# Patient Record
Sex: MALE | Race: WHITE | NOT HISPANIC OR LATINO | Employment: FULL TIME | URBAN - METROPOLITAN AREA
[De-identification: names, ages, dates, MRNs, and addresses within clinical notes are randomized per-mention and may not be internally consistent; named-entity substitution may affect disease eponyms.]

---

## 2018-03-29 RX ORDER — LOSARTAN POTASSIUM 50 MG/1
50 TABLET ORAL DAILY
COMMUNITY
Start: 2017-07-19 | End: 2018-03-29 | Stop reason: SDUPTHER

## 2018-03-29 RX ORDER — LOSARTAN POTASSIUM 50 MG/1
50 TABLET ORAL DAILY
Qty: 90 TABLET | Refills: 3 | Status: SHIPPED | OUTPATIENT
Start: 2018-03-29 | End: 2019-01-30 | Stop reason: SDUPTHER

## 2018-05-14 ENCOUNTER — OFFICE VISIT (OUTPATIENT)
Dept: PRIMARY CARE | Facility: CLINIC | Age: 58
End: 2018-05-14
Payer: COMMERCIAL

## 2018-05-14 VITALS
OXYGEN SATURATION: 98 % | RESPIRATION RATE: 14 BRPM | BODY MASS INDEX: 26.99 KG/M2 | TEMPERATURE: 98.2 F | DIASTOLIC BLOOD PRESSURE: 70 MMHG | HEART RATE: 68 BPM | SYSTOLIC BLOOD PRESSURE: 110 MMHG | HEIGHT: 71 IN | WEIGHT: 192.8 LBS

## 2018-05-14 DIAGNOSIS — I10 ESSENTIAL HYPERTENSION: ICD-10-CM

## 2018-05-14 DIAGNOSIS — Z00.00 PHYSICAL EXAM: Primary | ICD-10-CM

## 2018-05-14 PROCEDURE — 99396 PREV VISIT EST AGE 40-64: CPT | Performed by: INTERNAL MEDICINE

## 2018-05-14 RX ORDER — TOBRAMYCIN AND DEXAMETHASONE 3; 1 MG/ML; MG/ML
SUSPENSION/ DROPS OPHTHALMIC
COMMUNITY
Start: 2017-05-08 | End: 2018-05-14

## 2018-05-14 RX ORDER — GUAIFENESIN 600 MG/1
1200 TABLET, EXTENDED RELEASE ORAL 2 TIMES DAILY
COMMUNITY
End: 2023-05-12 | Stop reason: ALTCHOICE

## 2018-05-14 RX ORDER — VIT C/E/ZN/COPPR/LUTEIN/ZEAXAN 250MG-90MG
1000 CAPSULE ORAL DAILY
COMMUNITY
Start: 2015-10-15

## 2018-05-14 RX ORDER — IPRATROPIUM BROMIDE 21 UG/1
2 SPRAY, METERED NASAL
Refills: 3 | COMMUNITY
Start: 2018-04-23 | End: 2023-05-12 | Stop reason: ALTCHOICE

## 2018-05-14 RX ORDER — ASPIRIN 81 MG/1
81 TABLET ORAL
COMMUNITY
Start: 2012-03-29 | End: 2023-05-12 | Stop reason: ALTCHOICE

## 2018-05-14 RX ORDER — MULTIVITAMIN
TABLET ORAL
COMMUNITY
Start: 2012-03-29 | End: 2023-05-12 | Stop reason: ALTCHOICE

## 2018-05-14 RX ORDER — MONTELUKAST SODIUM 10 MG/1
10 TABLET ORAL
Refills: 3 | COMMUNITY
Start: 2018-04-23 | End: 2023-05-12 | Stop reason: SDUPTHER

## 2018-05-14 ASSESSMENT — ENCOUNTER SYMPTOMS
EYE ITCHING: 0
EYE DISCHARGE: 0
BACK PAIN: 0
COUGH: 0
POLYDIPSIA: 0
CONFUSION: 0
SHORTNESS OF BREATH: 0
ABDOMINAL DISTENTION: 0
ABDOMINAL PAIN: 0
DYSURIA: 0
LIGHT-HEADEDNESS: 0
SORE THROAT: 0
SINUS PRESSURE: 0
RHINORRHEA: 0
FATIGUE: 0
FREQUENCY: 0
FEVER: 0
DIZZINESS: 0
CHILLS: 0
APNEA: 0
NERVOUS/ANXIOUS: 0
ARTHRALGIAS: 0
SINUS PAIN: 0

## 2018-05-14 NOTE — PROGRESS NOTES
"Subjective  here for physical     Patient ID: Joseph Fletcher is a 57 y.o. male.    HPI no complaints    The following have been reviewed and updated as appropriate in this visit:       Review of Systems   Constitutional: Negative for chills, fatigue and fever.   HENT: Negative for congestion, rhinorrhea, sinus pain, sinus pressure and sore throat.    Eyes: Negative for discharge and itching.   Respiratory: Negative for apnea, cough and shortness of breath.    Cardiovascular: Negative for chest pain and leg swelling.   Gastrointestinal: Negative for abdominal distention and abdominal pain.   Endocrine: Negative for polydipsia and polyuria.   Genitourinary: Negative for dysuria and frequency.   Musculoskeletal: Negative for arthralgias and back pain.   Neurological: Negative for dizziness and light-headedness.   Psychiatric/Behavioral: Negative for confusion. The patient is not nervous/anxious.        Objective     Vitals:    05/14/18 1420   BP: 110/70   BP Location: Left upper arm   Patient Position: Sitting   Pulse: 68   Resp: 14   Temp: 36.8 °C (98.2 °F)   TempSrc: Oral   SpO2: 98%   Weight: 87.5 kg (192 lb 12.8 oz)   Height: 1.803 m (5' 11\")     Body mass index is 26.89 kg/m².    Physical Exam   Constitutional: He is oriented to person, place, and time. He appears well-developed and well-nourished.   HENT:   Head: Normocephalic and atraumatic.   Nose: Nose normal.   Mouth/Throat: Oropharynx is clear and moist.   Eyes: Conjunctivae and EOM are normal. Pupils are equal, round, and reactive to light.   Cardiovascular: Normal rate, regular rhythm, normal heart sounds and intact distal pulses.  Exam reveals no gallop and no friction rub.    No murmur heard.  Pulmonary/Chest: Effort normal and breath sounds normal. No respiratory distress. He has no wheezes. He has no rales. He exhibits no tenderness.   Musculoskeletal: Normal range of motion. He exhibits no edema.   Neurological: He is alert and oriented to person, " place, and time. He displays normal reflexes. No cranial nerve deficit or sensory deficit. He exhibits normal muscle tone. Coordination normal.   Skin: Skin is warm and dry.       Assessment/Plan   Problem List Items Addressed This Visit     HTN (hypertension)     Pressure at goal         Physical exam - Primary     Normal.  Check labs         Relevant Orders    Comprehensive metabolic panel    Lipid panel    CBC and Differential    Hepatitis C antibody    PSA

## 2018-05-15 LAB
ALBUMIN SERPL-MCNC: 4.1 G/DL (ref 3.6–5.1)
ALBUMIN/GLOB SERPL: 1.4 (CALC) (ref 1–2.5)
ALP SERPL-CCNC: 50 U/L (ref 40–115)
ALT SERPL-CCNC: 23 U/L (ref 9–46)
AST SERPL-CCNC: 23 U/L (ref 10–35)
BASOPHILS # BLD AUTO: 23 CELLS/UL (ref 0–200)
BASOPHILS NFR BLD AUTO: 0.3 %
BILIRUB SERPL-MCNC: 0.8 MG/DL (ref 0.2–1.2)
BUN SERPL-MCNC: 26 MG/DL (ref 7–25)
BUN/CREAT SERPL: 21 (CALC) (ref 6–22)
CALCIUM SERPL-MCNC: 9.6 MG/DL (ref 8.6–10.3)
CHLORIDE SERPL-SCNC: 106 MMOL/L (ref 98–110)
CHOLEST SERPL-MCNC: 168 MG/DL
CHOLEST/HDLC SERPL: 2.7 (CALC)
CO2 SERPL-SCNC: 27 MMOL/L (ref 20–31)
CREAT SERPL-MCNC: 1.23 MG/DL (ref 0.7–1.33)
EOSINOPHIL # BLD AUTO: 31 CELLS/UL (ref 15–500)
EOSINOPHIL NFR BLD AUTO: 0.4 %
ERYTHROCYTE [DISTWIDTH] IN BLOOD BY AUTOMATED COUNT: 12.1 % (ref 11–15)
GFR SERPL CREATININE-BSD FRML MDRD: 64 ML/MIN/1.73M2
GLOBULIN SER CALC-MCNC: 3 G/DL (CALC) (ref 1.9–3.7)
GLUCOSE SERPL-MCNC: 88 MG/DL (ref 65–99)
HCT VFR BLD AUTO: 44.8 % (ref 38.5–50)
HCV AB S/CO SERPL IA: 0.02
HCV AB SERPL QL IA: NORMAL
HDLC SERPL-MCNC: 63 MG/DL
HGB BLD-MCNC: 15.1 G/DL (ref 13.2–17.1)
LDLC SERPL CALC-MCNC: 94 MG/DL (CALC)
LYMPHOCYTES # BLD AUTO: 1654 CELLS/UL (ref 850–3900)
LYMPHOCYTES NFR BLD AUTO: 21.2 %
MCH RBC QN AUTO: 30.9 PG (ref 27–33)
MCHC RBC AUTO-ENTMCNC: 33.7 G/DL (ref 32–36)
MCV RBC AUTO: 91.6 FL (ref 80–100)
MONOCYTES # BLD AUTO: 538 CELLS/UL (ref 200–950)
MONOCYTES NFR BLD AUTO: 6.9 %
NEUTROPHILS # BLD AUTO: 5554 CELLS/UL (ref 1500–7800)
NEUTROPHILS NFR BLD AUTO: 71.2 %
NONHDLC SERPL-MCNC: 105 MG/DL (CALC)
PLATELET # BLD AUTO: 244 THOUSAND/UL (ref 140–400)
PMV BLD REES-ECKER: 10.2 FL (ref 7.5–12.5)
POTASSIUM SERPL-SCNC: 4.3 MMOL/L (ref 3.5–5.3)
PROT SERPL-MCNC: 7.1 G/DL (ref 6.1–8.1)
PSA SERPL-MCNC: 1.4 NG/ML
RBC # BLD AUTO: 4.89 MILLION/UL (ref 4.2–5.8)
SODIUM SERPL-SCNC: 140 MMOL/L (ref 135–146)
TRIGL SERPL-MCNC: 39 MG/DL
WBC # BLD AUTO: 7.8 THOUSAND/UL (ref 3.8–10.8)

## 2019-01-30 ENCOUNTER — TELEPHONE (OUTPATIENT)
Dept: PRIMARY CARE | Facility: CLINIC | Age: 59
End: 2019-01-30

## 2019-01-30 RX ORDER — LOSARTAN POTASSIUM 50 MG/1
50 TABLET ORAL DAILY
Qty: 90 TABLET | Refills: 3 | Status: SHIPPED | OUTPATIENT
Start: 2019-01-30 | End: 2020-03-16 | Stop reason: SDUPTHER

## 2019-01-30 NOTE — TELEPHONE ENCOUNTER
Pt called stating his meds need to be faxed to [phone].  Any questions please contact Mr. Fletcher at [phone].

## 2019-03-08 ENCOUNTER — TELEPHONE (OUTPATIENT)
Dept: PRIMARY CARE | Facility: CLINIC | Age: 59
End: 2019-03-08

## 2019-03-08 NOTE — TELEPHONE ENCOUNTER
Pts sister was just diagnose with Bicuspid dilated aortic valve and recently had surgery for this. He was told its genetic and he should have an ultrasound of his heart    Can you please call to discuss and let him know if we can order or if dr jimenez needs to see him

## 2019-03-08 NOTE — TELEPHONE ENCOUNTER
I ledesma erick pt. He was pawan yarbrough his sister's cardiologist that it is important that he has the US of his heart to help r/o the same Bicuspid valve disease that his siter has. I told him I would talk to dr. Cochran.

## 2019-05-21 ENCOUNTER — TRANSCRIBE ORDERS (OUTPATIENT)
Dept: SCHEDULING | Age: 59
End: 2019-05-21

## 2019-05-21 DIAGNOSIS — M25.562 PAIN IN LEFT KNEE: Primary | ICD-10-CM

## 2019-06-03 ENCOUNTER — OFFICE VISIT (OUTPATIENT)
Dept: PRIMARY CARE | Facility: CLINIC | Age: 59
End: 2019-06-03
Payer: COMMERCIAL

## 2019-06-03 VITALS
SYSTOLIC BLOOD PRESSURE: 122 MMHG | HEART RATE: 57 BPM | HEIGHT: 70 IN | DIASTOLIC BLOOD PRESSURE: 70 MMHG | RESPIRATION RATE: 20 BRPM | BODY MASS INDEX: 27.77 KG/M2 | TEMPERATURE: 97.9 F | OXYGEN SATURATION: 98 % | WEIGHT: 194 LBS

## 2019-06-03 DIAGNOSIS — I10 ESSENTIAL HYPERTENSION: ICD-10-CM

## 2019-06-03 DIAGNOSIS — M15.0 PRIMARY OSTEOARTHRITIS INVOLVING MULTIPLE JOINTS: ICD-10-CM

## 2019-06-03 DIAGNOSIS — Z00.00 PHYSICAL EXAM: ICD-10-CM

## 2019-06-03 DIAGNOSIS — Z12.11 SCREENING FOR COLON CANCER: Primary | ICD-10-CM

## 2019-06-03 PROCEDURE — 99214 OFFICE O/P EST MOD 30 MIN: CPT | Performed by: INTERNAL MEDICINE

## 2019-06-03 NOTE — PROGRESS NOTES
"Subjective      Patient ID: Joseph Fletcher is a 58 y.o. male.  1960      HPI  Patient here for his annual physical examination  He is normally seen and followed up by Dr. Nic Cochran.  Patient medical records reviewed.  He offers no new complaints he is extremely active but lately has noticed that after playing sports like tennis he gets bilateral knee pain weakness and fatigue.  He denied any chest pain no radiation of pain no palpitation no diaphoresis no history of cough fever chills or dysuria  No abdominal pain no nausea vomiting diarrhea patient's bowel movements are regular  No headaches visual symptom dizziness fainting spell or syncopal episode.  Patient is compliant with medication he does not report any intolerance of any side effects to the current medications  Sleep is adequate occasionally wakes up at night to go to the bathroom.  Appetite is good with good p.o. intake patient is not taking any particular type output diet.  He is very active and most of the exercises or sports.  The following have been reviewed and updated as appropriate in this visit:  Problems       Review of Systems   All other systems reviewed and are negative.  Except as noted in HPI    Objective     Vitals:    06/03/19 1404   BP: 122/70   BP Location: Left upper arm   Patient Position: Sitting   Pulse: (!) 57   Resp: 20   Temp: 36.6 °C (97.9 °F)   TempSrc: Oral   SpO2: 98%   Weight: 88 kg (194 lb)   Height: 1.778 m (5' 10\")     Body mass index is 27.84 kg/m².    Physical Exam   Constitutional: He is oriented to person, place, and time. He appears well-developed and well-nourished.   HENT:   Head: Normocephalic and atraumatic.   Eyes: Pupils are equal, round, and reactive to light. EOM are normal.   Neck: Normal range of motion. Neck supple.   Cardiovascular: Normal rate, regular rhythm, normal heart sounds and intact distal pulses.    Pulmonary/Chest: Effort normal and breath sounds normal.   Abdominal: Soft. Bowel sounds " are normal. He exhibits no distension and no mass. There is no tenderness. There is no guarding.   Musculoskeletal: Normal range of motion.   Neurological: He is alert and oriented to person, place, and time. No cranial nerve deficit.   Skin: Skin is warm and dry.   Vitals reviewed.      Assessment/Plan   Diagnoses and all orders for this visit:    Screening for colon cancer (Primary)  -     Direct Access Colonoscopy [facility]; Future    Essential hypertension    Primary osteoarthritis involving multiple joints    Physical exam  -     Basic metabolic panel; Future  -     CBC and Differential; Future  -     Hemoglobin A1c; Future  -     Hepatic function panel; Future  -     Lipid panel; Future  -     TSH 3rd Generation; Future  -     PSA; Future    Patient's physical examination is within normal limits.  I referred him for colonoscopy  I referred him for routine laboratory work-up.  Patient has schedule appointment with Dr. Park from urology next month.

## 2019-06-05 LAB
ALBUMIN SERPL-MCNC: 3.9 G/DL (ref 3.6–5.1)
ALBUMIN/GLOB SERPL: 1.6 (CALC) (ref 1–2.5)
ALP SERPL-CCNC: 41 U/L (ref 40–115)
ALT SERPL-CCNC: 20 U/L (ref 9–46)
AST SERPL-CCNC: 20 U/L (ref 10–35)
BASOPHILS # BLD AUTO: 22 CELLS/UL (ref 0–200)
BASOPHILS NFR BLD AUTO: 0.4 %
BILIRUB DIRECT SERPL-MCNC: 0.2 MG/DL
BILIRUB INDIRECT SERPL-MCNC: 0.7 MG/DL (CALC) (ref 0.2–1.2)
BILIRUB SERPL-MCNC: 0.9 MG/DL (ref 0.2–1.2)
BUN SERPL-MCNC: 21 MG/DL (ref 7–25)
BUN/CREAT SERPL: NORMAL (CALC) (ref 6–22)
CALCIUM SERPL-MCNC: 9.1 MG/DL (ref 8.6–10.3)
CHLORIDE SERPL-SCNC: 106 MMOL/L (ref 98–110)
CHOLEST SERPL-MCNC: 183 MG/DL
CHOLEST/HDLC SERPL: 3.2 (CALC)
CO2 SERPL-SCNC: 29 MMOL/L (ref 20–32)
CREAT SERPL-MCNC: 1.04 MG/DL (ref 0.7–1.33)
EOSINOPHIL # BLD AUTO: 83 CELLS/UL (ref 15–500)
EOSINOPHIL NFR BLD AUTO: 1.5 %
ERYTHROCYTE [DISTWIDTH] IN BLOOD BY AUTOMATED COUNT: 12.3 % (ref 11–15)
GLOBULIN SER CALC-MCNC: 2.5 G/DL (CALC) (ref 1.9–3.7)
GLUCOSE SERPL-MCNC: 94 MG/DL (ref 65–99)
HBA1C MFR BLD: 5.3 % OF TOTAL HGB
HCT VFR BLD AUTO: 45 % (ref 38.5–50)
HDLC SERPL-MCNC: 57 MG/DL
HGB BLD-MCNC: 15.1 G/DL (ref 13.2–17.1)
LDLC SERPL CALC-MCNC: 112 MG/DL (CALC)
LYMPHOCYTES # BLD AUTO: 1474 CELLS/UL (ref 850–3900)
LYMPHOCYTES NFR BLD AUTO: 26.8 %
MCH RBC QN AUTO: 31.1 PG (ref 27–33)
MCHC RBC AUTO-ENTMCNC: 33.6 G/DL (ref 32–36)
MCV RBC AUTO: 92.6 FL (ref 80–100)
MONOCYTES # BLD AUTO: 413 CELLS/UL (ref 200–950)
MONOCYTES NFR BLD AUTO: 7.5 %
NEUTROPHILS # BLD AUTO: 3509 CELLS/UL (ref 1500–7800)
NEUTROPHILS NFR BLD AUTO: 63.8 %
NONHDLC SERPL-MCNC: 126 MG/DL (CALC)
PLATELET # BLD AUTO: 214 THOUSAND/UL (ref 140–400)
PMV BLD REES-ECKER: 10 FL (ref 7.5–12.5)
POTASSIUM SERPL-SCNC: 4.4 MMOL/L (ref 3.5–5.3)
PROT SERPL-MCNC: 6.4 G/DL (ref 6.1–8.1)
PSA SERPL-MCNC: 1.5 NG/ML
QUEST EGFR NON-AFR. AMERICAN: 78 ML/MIN/1.73M2
RBC # BLD AUTO: 4.86 MILLION/UL (ref 4.2–5.8)
SODIUM SERPL-SCNC: 140 MMOL/L (ref 135–146)
TRIGL SERPL-MCNC: 55 MG/DL
TSH SERPL-ACNC: 1.72 MIU/L (ref 0.4–4.5)
WBC # BLD AUTO: 5.5 THOUSAND/UL (ref 3.8–10.8)

## 2019-06-07 ENCOUNTER — TELEPHONE (OUTPATIENT)
Dept: PRIMARY CARE | Facility: CLINIC | Age: 59
End: 2019-06-07

## 2019-06-11 NOTE — TELEPHONE ENCOUNTER
----- Message from Rosales Melgar MD sent at 6/11/2019 10:05 AM EDT -----  Please call and let patient know results of blood work.

## 2019-06-12 ENCOUNTER — TELEPHONE (OUTPATIENT)
Dept: PRIMARY CARE | Facility: CLINIC | Age: 59
End: 2019-06-12

## 2019-09-20 ENCOUNTER — TELEPHONE (OUTPATIENT)
Dept: PRIMARY CARE | Facility: CLINIC | Age: 59
End: 2019-09-20

## 2020-01-10 ENCOUNTER — TELEPHONE (OUTPATIENT)
Dept: PRIMARY CARE | Facility: CLINIC | Age: 60
End: 2020-01-10

## 2020-01-10 NOTE — TELEPHONE ENCOUNTER
Pt was diagnosed with seborrheic keratosis.  There is a process that the area has to be frozen and fallen off.  Does Dr. Cochran do this in the office or does he need to see a dermatologist.  Please advise.  Contact number  [phone]

## 2020-03-10 ENCOUNTER — TELEPHONE (OUTPATIENT)
Dept: PRIMARY CARE | Facility: CLINIC | Age: 60
End: 2020-03-10

## 2020-03-10 NOTE — TELEPHONE ENCOUNTER
Pt requesting a new prescription for Losartan 50 mg. Current prescription .    Pt states he needs the prescription filled today with [facility] so they can have it mailed out before his prescription runs out.  If it cannot be done today then the [facility] needs to be called so they can give him enough to cover until the mail order arrives.    Pt would like a call if prescription can be filled today or not. Pt can be reached at  [phone].

## 2020-03-16 RX ORDER — LOSARTAN POTASSIUM 50 MG/1
50 TABLET ORAL DAILY
Qty: 90 TABLET | Refills: 3 | Status: SHIPPED | OUTPATIENT
Start: 2020-03-16 | End: 2021-01-12 | Stop reason: ALTCHOICE

## 2020-03-16 RX ORDER — OLMESARTAN MEDOXOMIL 20 MG/1
20 TABLET ORAL DAILY
Qty: 30 TABLET | Refills: 0 | Status: SHIPPED | OUTPATIENT
Start: 2020-03-16 | End: 2020-04-08

## 2020-04-08 RX ORDER — OLMESARTAN MEDOXOMIL 20 MG/1
20 TABLET ORAL
Qty: 30 TABLET | Refills: 0 | Status: SHIPPED | OUTPATIENT
Start: 2020-04-08 | End: 2020-08-25

## 2020-04-08 NOTE — TELEPHONE ENCOUNTER
Medicine Refill Request    Last Office Visit: 6/3/2019  Next Office Visit: 6/8/2020      Current Outpatient Medications: •  aspirin 81 mg enteric coated tablet, 81 mg., Disp: , Rfl: •  cetirizine (ZyrTEC) 10 mg tablet, 10 mg., Disp: , Rfl: •  cholecalciferol, vitamin D3, (VITAMIN D3) 1,000 unit capsule, No SIG Entered, Disp: , Rfl: •  guaiFENesin (MUCINEX) 600 mg 12 hr tablet, Take 1,200 mg by mouth 2 (two) times a day., Disp: , Rfl: •  ipratropium (ATROVENT) 0.03 % nasal spray, Administer 2 sprays into each nostril 2 (two) times a day., Disp: , Rfl: 3•  losartan (COZAAR) 50 mg tablet, Take 1 tablet (50 mg total) by mouth daily., Disp: 90 tablet, Rfl: 3•  montelukast (SINGULAIR) 10 mg tablet, Take 10 mg by mouth once daily., Disp: , Rfl: 3•  multivitamin (THERAGRAN) tablet, take 1 tablet by oral route  every day with food, Disp: , Rfl: •  olmesartan (BENICAR) 20 mg tablet, Take 1 tablet (20 mg total) by mouth daily., Disp: 30 tablet, Rfl: 0      BP Readings from Last 3 Encounters:  06/03/19 : 122/70  05/14/18 : 110/70      Recent Lab results:  Lab Results       Component                Value               Date                       CHOL                     183                 06/03/2019          , Lab Results       Component                Value               Date                       HDL                      57                  06/03/2019          , Lab Results       Component                Value               Date                       LDLCALC                  112 (H)             06/03/2019          , Lab Results       Component                Value               Date                       TRIG                     55                  06/03/2019               Lab Results       Component                Value               Date                       GLUCOSE                  94                  06/03/2019          , Lab Results       Component                Value               Date                       HGBA1C                    5.3                 06/03/2019                Lab Results       Component                Value               Date                       CREATININE               1.04                06/03/2019              Lab Results       Component                Value               Date                       TSH                      1.72                06/03/2019

## 2020-05-22 ENCOUNTER — TELEPHONE (OUTPATIENT)
Dept: PRIMARY CARE | Facility: CLINIC | Age: 60
End: 2020-05-22

## 2020-05-22 DIAGNOSIS — Z13.0 SCREENING FOR IRON DEFICIENCY ANEMIA: ICD-10-CM

## 2020-05-22 DIAGNOSIS — N40.0 BENIGN PROSTATIC HYPERPLASIA, UNSPECIFIED WHETHER LOWER URINARY TRACT SYMPTOMS PRESENT: ICD-10-CM

## 2020-05-22 DIAGNOSIS — Z13.220 SCREENING FOR HYPERLIPIDEMIA: ICD-10-CM

## 2020-05-22 DIAGNOSIS — I10 ESSENTIAL HYPERTENSION: Primary | ICD-10-CM

## 2020-05-22 NOTE — TELEPHONE ENCOUNTER
This patient has an upcoming appointment with your office and would like to have their lab work completed prior to the visit.         Patient Preferred Laboratory: [facility]    Patient Primary Insurance in Epic:  N/A         or [place] Mail?:Email if possible, otherwise mail.  Pt stated that the wrong codes have been used in the past and he received a bill for his blood work.  He is requesting that the correct codes are used.

## 2020-06-05 ENCOUNTER — TRANSCRIBE ORDERS (OUTPATIENT)
Dept: LAB | Facility: HOSPITAL | Age: 60
End: 2020-06-05

## 2020-06-05 ENCOUNTER — APPOINTMENT (OUTPATIENT)
Dept: LAB | Facility: HOSPITAL | Age: 60
End: 2020-06-05
Attending: INTERNAL MEDICINE
Payer: COMMERCIAL

## 2020-06-05 DIAGNOSIS — N40.0 BENIGN PROSTATIC HYPERPLASIA WITHOUT LOWER URINARY TRACT SYMPTOMS: Primary | ICD-10-CM

## 2020-06-05 DIAGNOSIS — N40.0 BENIGN PROSTATIC HYPERPLASIA WITHOUT LOWER URINARY TRACT SYMPTOMS: ICD-10-CM

## 2020-06-05 PROCEDURE — 30099997 SPECIMEN PROCESSING

## 2020-06-07 LAB
ALBUMIN SERPL-MCNC: 4.3 G/DL (ref 3.6–5.1)
ALBUMIN/GLOB SERPL: 1.7 (CALC) (ref 1–2.5)
ALP SERPL-CCNC: 51 U/L (ref 35–144)
ALT SERPL-CCNC: 28 U/L (ref 9–46)
APPEARANCE UR: CLEAR
AST SERPL-CCNC: 26 U/L (ref 10–35)
BACTERIA #/AREA URNS HPF: ABNORMAL /HPF
BACTERIA UR CULT: ABNORMAL
BASOPHILS # BLD AUTO: 21 CELLS/UL (ref 0–200)
BASOPHILS NFR BLD AUTO: 0.3 %
BILIRUB SERPL-MCNC: 0.8 MG/DL (ref 0.2–1.2)
BILIRUB UR QL STRIP: NEGATIVE
BUN SERPL-MCNC: 28 MG/DL (ref 7–25)
BUN/CREAT SERPL: 23 (CALC) (ref 6–22)
CALCIUM SERPL-MCNC: 9.7 MG/DL (ref 8.6–10.3)
CHLORIDE SERPL-SCNC: 101 MMOL/L (ref 98–110)
CHOLEST SERPL-MCNC: 164 MG/DL
CHOLEST/HDLC SERPL: 3.2 (CALC)
CO2 SERPL-SCNC: 29 MMOL/L (ref 20–32)
COLOR UR: YELLOW
CREAT SERPL-MCNC: 1.23 MG/DL (ref 0.7–1.25)
EOSINOPHIL # BLD AUTO: 78 CELLS/UL (ref 15–500)
EOSINOPHIL NFR BLD AUTO: 1.1 %
ERYTHROCYTE [DISTWIDTH] IN BLOOD BY AUTOMATED COUNT: 12.5 % (ref 11–15)
GLOBULIN SER CALC-MCNC: 2.5 G/DL (CALC) (ref 1.9–3.7)
GLUCOSE SERPL-MCNC: 82 MG/DL (ref 65–99)
GLUCOSE UR QL STRIP: NEGATIVE
HCT VFR BLD AUTO: 45.3 % (ref 38.5–50)
HDLC SERPL-MCNC: 52 MG/DL
HGB BLD-MCNC: 15.1 G/DL (ref 13.2–17.1)
HGB UR QL STRIP: ABNORMAL
HYALINE CASTS #/AREA URNS LPF: ABNORMAL /LPF
KETONES UR QL STRIP: NEGATIVE
LDLC SERPL CALC-MCNC: 98 MG/DL (CALC)
LEUKOCYTE ESTERASE UR QL STRIP: NEGATIVE
LYMPHOCYTES # BLD AUTO: 2095 CELLS/UL (ref 850–3900)
LYMPHOCYTES NFR BLD AUTO: 29.5 %
MCH RBC QN AUTO: 30.4 PG (ref 27–33)
MCHC RBC AUTO-ENTMCNC: 33.3 G/DL (ref 32–36)
MCV RBC AUTO: 91.3 FL (ref 80–100)
MONOCYTES # BLD AUTO: 575 CELLS/UL (ref 200–950)
MONOCYTES NFR BLD AUTO: 8.1 %
NEUTROPHILS # BLD AUTO: 4331 CELLS/UL (ref 1500–7800)
NEUTROPHILS NFR BLD AUTO: 61 %
NITRITE UR QL STRIP: NEGATIVE
NONHDLC SERPL-MCNC: 112 MG/DL (CALC)
PH UR STRIP: 6 [PH] (ref 5–8)
PLATELET # BLD AUTO: 271 THOUSAND/UL (ref 140–400)
PMV BLD REES-ECKER: 10.3 FL (ref 7.5–12.5)
POTASSIUM SERPL-SCNC: 4.4 MMOL/L (ref 3.5–5.3)
PROT SERPL-MCNC: 6.8 G/DL (ref 6.1–8.1)
PROT UR QL STRIP: NEGATIVE
PSA SERPL-MCNC: 1.9 NG/ML
QUEST EGFR NON-AFR. AMERICAN: 63 ML/MIN/1.73M2
RBC # BLD AUTO: 4.96 MILLION/UL (ref 4.2–5.8)
RBC #/AREA URNS HPF: ABNORMAL /HPF
SODIUM SERPL-SCNC: 137 MMOL/L (ref 135–146)
SP GR UR STRIP: 1.02 (ref 1–1.03)
SQUAMOUS #/AREA URNS HPF: ABNORMAL /HPF
TRIGL SERPL-MCNC: 53 MG/DL
WBC # BLD AUTO: 7.1 THOUSAND/UL (ref 3.8–10.8)
WBC #/AREA URNS HPF: ABNORMAL /HPF

## 2020-06-15 ENCOUNTER — TELEPHONE (OUTPATIENT)
Dept: PRIMARY CARE | Facility: CLINIC | Age: 60
End: 2020-06-15

## 2020-06-15 NOTE — TELEPHONE ENCOUNTER
----- Message from Nic Cochran MD sent at 6/15/2020  1:33 PM EDT -----  Mild bump in PSA  Send to urology  ----- Message -----  From: Taz Goins Lab Results In  Sent: 6/5/2020  11:15 PM EDT  To: Nic Cochran MD

## 2020-06-16 ENCOUNTER — TELEPHONE (OUTPATIENT)
Dept: PRIMARY CARE | Facility: CLINIC | Age: 60
End: 2020-06-16

## 2020-08-25 RX ORDER — OLMESARTAN MEDOXOMIL 20 MG/1
TABLET ORAL
Qty: 90 TABLET | Refills: 3 | Status: SHIPPED | OUTPATIENT
Start: 2020-08-25 | End: 2021-01-12

## 2020-12-14 ENCOUNTER — OFFICE VISIT (OUTPATIENT)
Dept: PRIMARY CARE | Facility: CLINIC | Age: 60
End: 2020-12-14
Payer: COMMERCIAL

## 2020-12-14 VITALS
TEMPERATURE: 99 F | OXYGEN SATURATION: 98 % | RESPIRATION RATE: 18 BRPM | HEIGHT: 70 IN | WEIGHT: 193 LBS | DIASTOLIC BLOOD PRESSURE: 80 MMHG | BODY MASS INDEX: 27.63 KG/M2 | HEART RATE: 53 BPM | SYSTOLIC BLOOD PRESSURE: 120 MMHG

## 2020-12-14 DIAGNOSIS — L29.9 ITCHING: ICD-10-CM

## 2020-12-14 DIAGNOSIS — I10 ESSENTIAL HYPERTENSION: Primary | ICD-10-CM

## 2020-12-14 PROCEDURE — 99214 OFFICE O/P EST MOD 30 MIN: CPT | Performed by: INTERNAL MEDICINE

## 2020-12-14 ASSESSMENT — ENCOUNTER SYMPTOMS
SORE THROAT: 0
LIGHT-HEADEDNESS: 0
HEADACHES: 0
ABDOMINAL PAIN: 0
SHORTNESS OF BREATH: 0
APPETITE CHANGE: 0
DIARRHEA: 0
COLOR CHANGE: 0

## 2020-12-14 NOTE — PROGRESS NOTES
"      Subjective  itching     Patient ID: Joseph Fletcher is a 60 y.o. male.  1960      HPI 59 y/o male presents for chronic itching. Pt states the itching has been ongoing for years. Pt states his whole body is itching but the past month his arms have been worse. Pt states there is pain associated with the itching on the arms especially at night. Pt describes the feeling as if wanting to \"unzip his skin\". Pt states this has been affecting his sleep. Pt has followed up with an allergist and had gotten an allergy test without conclusive results. Pt is taking an allergy pill at night. Pt denies runny nose, mucus, and rash.    The following have been reviewed and updated as appropriate in this visit:  Problems       Review of Systems   Constitutional: Negative for appetite change.   HENT: Negative for ear pain and sore throat.    Respiratory: Negative for shortness of breath.    Cardiovascular: Negative for chest pain and leg swelling.   Gastrointestinal: Negative for abdominal pain and diarrhea.   Endocrine: Negative for heat intolerance.   Genitourinary: Negative for urgency.   Skin: Negative for color change.   Neurological: Negative for light-headedness and headaches.       Objective     Vitals:    12/14/20 1355 12/14/20 1409   BP: 122/66 120/80   BP Location:  Right upper arm   Patient Position:  Sitting   Pulse: (!) 53    Resp: 18    Temp: 37.2 °C (99 °F)    SpO2: 98%    Weight: 87.5 kg (193 lb)    Height: 1.778 m (5' 10\")      Body mass index is 27.69 kg/m².    Physical Exam  Vitals signs and nursing note reviewed.   Constitutional:       Appearance: He is well-developed.   HENT:      Head: Normocephalic and atraumatic.      Right Ear: External ear normal.      Left Ear: External ear normal.      Nose: Nose normal.   Eyes:      Conjunctiva/sclera: Conjunctivae normal.      Pupils: Pupils are equal, round, and reactive to light.   Neck:      Musculoskeletal: Normal range of motion and neck supple. "   Cardiovascular:      Rate and Rhythm: Normal rate and regular rhythm.      Heart sounds: Normal heart sounds.   Pulmonary:      Effort: Pulmonary effort is normal.      Breath sounds: Normal breath sounds.   Abdominal:      General: Bowel sounds are normal.      Palpations: Abdomen is soft.   Musculoskeletal: Normal range of motion.   Skin:     General: Skin is warm and dry.   Neurological:      Mental Status: He is alert and oriented to person, place, and time.         Assessment/Plan   Diagnoses and all orders for this visit:    Essential hypertension (Primary)  Comments:  Blood pressure okay    Itching  Comments:  This apparently has been a chronic issue.  Besides diffuse skin itching worse on his upper extremities no other allergic symptoms been going on for a long time         By signing my name below, IPop, attest that this documentation has been prepared under the direction and in the presence of Nic Cochran MD      12/14/2020 2:27 PM     By signing my name below, Pop HUANG, attest that this documentation has been prepared under the direction and in the presence of Nic Cochran MD      12/14/2020 2:27 PM     INic MD, personally performed the services described in this documentation, as documented by the scribe in my presence, and it is both accurate and complete.  12/14/2020 2:27 PM

## 2020-12-15 ENCOUNTER — LAB REQUISITION (OUTPATIENT)
Dept: LAB | Facility: HOSPITAL | Age: 60
End: 2020-12-15
Attending: UROLOGY
Payer: COMMERCIAL

## 2020-12-15 ENCOUNTER — APPOINTMENT (RX ONLY)
Dept: URBAN - METROPOLITAN AREA CLINIC 28 | Facility: CLINIC | Age: 60
Setting detail: DERMATOLOGY
End: 2020-12-15

## 2020-12-15 DIAGNOSIS — L27.0 GENERALIZED SKIN ERUPTION DUE TO DRUGS AND MEDICAMENTS TAKEN INTERNALLY: ICD-10-CM

## 2020-12-15 DIAGNOSIS — N40.1 BENIGN PROSTATIC HYPERPLASIA WITH LOWER URINARY TRACT SYMPTOMS: ICD-10-CM

## 2020-12-15 DIAGNOSIS — R31.1 BENIGN ESSENTIAL MICROSCOPIC HEMATURIA: ICD-10-CM

## 2020-12-15 PROCEDURE — ? PRESCRIPTION

## 2020-12-15 PROCEDURE — ? PRESCRIPTION MEDICATION MANAGEMENT

## 2020-12-15 PROCEDURE — 87086 URINE CULTURE/COLONY COUNT: CPT | Performed by: UROLOGY

## 2020-12-15 PROCEDURE — ? COUNSELING

## 2020-12-15 PROCEDURE — 99213 OFFICE O/P EST LOW 20 MIN: CPT

## 2020-12-15 RX ORDER — SALICYLIC ACID 3 G/100G
LOTION TOPICAL BID
Qty: 30 | Refills: 3 | Status: ERX | COMMUNITY
Start: 2020-12-15

## 2020-12-15 RX ORDER — TRIAMCINOLONE ACETONIDE 1 MG/G
CREAM TOPICAL BID
Qty: 1 | Refills: 2 | Status: ERX | COMMUNITY
Start: 2020-12-15

## 2020-12-15 RX ADMIN — TRIAMCINOLONE ACETONIDE: 1 CREAM TOPICAL at 00:00

## 2020-12-15 RX ADMIN — SALICYLIC ACID: 3 LOTION TOPICAL at 00:00

## 2020-12-15 ASSESSMENT — LOCATION SIMPLE DESCRIPTION DERM
LOCATION SIMPLE: LEFT POPLITEAL SKIN
LOCATION SIMPLE: RIGHT FOREARM
LOCATION SIMPLE: ABDOMEN
LOCATION SIMPLE: LEFT FOREARM
LOCATION SIMPLE: RIGHT POPLITEAL SKIN
LOCATION SIMPLE: RIGHT UPPER BACK

## 2020-12-15 ASSESSMENT — LOCATION DETAILED DESCRIPTION DERM
LOCATION DETAILED: LEFT POPLITEAL SKIN
LOCATION DETAILED: RIGHT POPLITEAL SKIN
LOCATION DETAILED: PERIUMBILICAL SKIN
LOCATION DETAILED: RIGHT INFERIOR MEDIAL UPPER BACK
LOCATION DETAILED: LEFT PROXIMAL DORSAL FOREARM
LOCATION DETAILED: RIGHT PROXIMAL RADIAL DORSAL FOREARM

## 2020-12-15 ASSESSMENT — LOCATION ZONE DERM
LOCATION ZONE: ARM
LOCATION ZONE: TRUNK
LOCATION ZONE: LEG

## 2020-12-15 NOTE — PROCEDURE: PRESCRIPTION MEDICATION MANAGEMENT
Detail Level: Zone
Render In Strict Bullet Format?: No
Plan: Discussed d/c losartan for 3 week. Advised pt to call his PCP. Also stop ibuprofen, mucinex, and aspirin
Initiate Treatment: Allegra 180 mg: take one tab po QDAY \\nTriamcinolone 0.1% cream: apply to AA of body BID

## 2020-12-16 ENCOUNTER — TRANSCRIBE ORDERS (OUTPATIENT)
Dept: SCHEDULING | Age: 60
End: 2020-12-16

## 2020-12-16 DIAGNOSIS — R31.1 BENIGN ESSENTIAL MICROSCOPIC HEMATURIA: Primary | ICD-10-CM

## 2020-12-16 LAB — BACTERIA UR CULT: NORMAL

## 2020-12-21 ENCOUNTER — HOSPITAL ENCOUNTER (OUTPATIENT)
Dept: RADIOLOGY | Age: 60
Discharge: HOME | End: 2020-12-21
Attending: UROLOGY
Payer: COMMERCIAL

## 2020-12-21 DIAGNOSIS — R31.1 BENIGN ESSENTIAL MICROSCOPIC HEMATURIA: ICD-10-CM

## 2020-12-21 PROCEDURE — 76775 US EXAM ABDO BACK WALL LIM: CPT

## 2021-01-11 ENCOUNTER — TELEPHONE (OUTPATIENT)
Dept: SCHEDULING | Facility: CLINIC | Age: 61
End: 2021-01-11

## 2021-01-11 NOTE — TELEPHONE ENCOUNTER
Pt called to see if he could come in and get his BP checked. Advised appt is needed. Pt stated he will call back if needed. He will search other avenues to get BP checked.

## 2021-01-12 ENCOUNTER — APPOINTMENT (RX ONLY)
Dept: URBAN - METROPOLITAN AREA CLINIC 28 | Facility: CLINIC | Age: 61
Setting detail: DERMATOLOGY
End: 2021-01-12

## 2021-01-12 ENCOUNTER — TELEPHONE (OUTPATIENT)
Dept: PRIMARY CARE | Facility: CLINIC | Age: 61
End: 2021-01-12

## 2021-01-12 DIAGNOSIS — D485 NEOPLASM OF UNCERTAIN BEHAVIOR OF SKIN: ICD-10-CM

## 2021-01-12 DIAGNOSIS — L27.0 GENERALIZED SKIN ERUPTION DUE TO DRUGS AND MEDICAMENTS TAKEN INTERNALLY: ICD-10-CM | Status: RESOLVED

## 2021-01-12 PROBLEM — D48.5 NEOPLASM OF UNCERTAIN BEHAVIOR OF SKIN: Status: ACTIVE | Noted: 2021-01-12

## 2021-01-12 PROCEDURE — ? COUNSELING

## 2021-01-12 PROCEDURE — ? PRESCRIPTION MEDICATION MANAGEMENT

## 2021-01-12 PROCEDURE — ? SHAVE REMOVAL

## 2021-01-12 PROCEDURE — 11312 SHAVE SKIN LESION 1.1-2.0 CM: CPT

## 2021-01-12 PROCEDURE — 99213 OFFICE O/P EST LOW 20 MIN: CPT | Mod: 25

## 2021-01-12 PROCEDURE — ? OTHER

## 2021-01-12 RX ORDER — LISINOPRIL 10 MG/1
10 TABLET ORAL DAILY
Qty: 30 TABLET | Refills: 1 | Status: SHIPPED | OUTPATIENT
Start: 2021-01-12 | End: 2023-05-12 | Stop reason: ALTCHOICE

## 2021-01-12 ASSESSMENT — LOCATION DETAILED DESCRIPTION DERM
LOCATION DETAILED: LEFT SUPERIOR FOREHEAD
LOCATION DETAILED: SUPERIOR THORACIC SPINE
LOCATION DETAILED: STERNUM

## 2021-01-12 ASSESSMENT — LOCATION SIMPLE DESCRIPTION DERM
LOCATION SIMPLE: CHEST
LOCATION SIMPLE: LEFT FOREHEAD
LOCATION SIMPLE: UPPER BACK

## 2021-01-12 ASSESSMENT — LOCATION ZONE DERM
LOCATION ZONE: TRUNK
LOCATION ZONE: FACE

## 2021-01-12 NOTE — PROCEDURE: PRESCRIPTION MEDICATION MANAGEMENT
Continue Regimen: Allegra 180mg- one tab po  BID, Triamcinolone 0.1% cream- apply a thin layer BID to AA of body PRN flare.
Render In Strict Bullet Format?: No
Detail Level: Zone

## 2021-01-12 NOTE — TELEPHONE ENCOUNTER
Pt states that his Dermatologist states that skin issues may be from meds so  he stopped all medication to get improvements in his skin. Pt is requesting a new bp med. Please give pt a call back

## 2021-01-12 NOTE — PROCEDURE: OTHER
Detail Level: Zone
Other (Free Text): Advised the patient to re-start Vitamin D(a white pill), and I will send a not to Dr. Bates stating that I believe the patient is allergic to Losartan and for him to switch his blood pressure medication.
Note Text (......Xxx Chief Complaint.): This diagnosis correlates with the
Render Risk Assessment In Note?: no

## 2021-01-12 NOTE — TELEPHONE ENCOUNTER
Spoke to patient. He stopped his losartan per his dermatologist recommendation to r/o allergy to medication. Patient needs an alternative medication. Informed Dr. Cochran and he ordered lisinopril 10 mg daily with f/u in one month. Relayed same to patient. Appointment scheduled.

## 2021-01-12 NOTE — PROCEDURE: SHAVE REMOVAL
Medical Necessity Information: It is in your best interest to select a reason for this procedure from the list below. All of these items fulfill various CMS LCD requirements except the new and changing color options.
Medical Necessity Clause: This procedure was medically necessary because the lesion that was treated was:
Lab: -[id number]
Detail Level: Detailed
Was A Bandage Applied: Yes
Size Of Lesion In Cm (Required): 1.1
X Size Of Lesion In Cm (Optional): 0
Biopsy Method: Dermablade
Anesthesia Type: 1% lidocaine without epinephrine
Hemostasis: Drysol
Wound Care: Petrolatum
Path Notes (To The Dermatopathologist): Please check margins.
Render Path Notes In Note?: No
Consent was obtained from the patient. The risks and benefits to therapy were discussed in detail. Specifically, the risks of infection, scarring, bleeding, prolonged wound healing, incomplete removal, allergy to anesthesia, nerve injury and recurrence were addressed. Prior to the procedure, the treatment site was clearly identified and confirmed by the patient. All components of Universal Protocol/PAUSE Rule completed.
Post-Care Instructions: I reviewed with the patient in detail post-care instructions. Patient is to keep the biopsy site dry overnight, and then apply bacitracin twice daily until healed. Patient may apply hydrogen peroxide soaks to remove any crusting.
Notification Instructions: Patient will be notified of biopsy results. However, patient instructed to call the office if not contacted within 2 weeks.
Billing Type: Third-Party Bill

## 2021-02-15 ENCOUNTER — OFFICE VISIT (OUTPATIENT)
Dept: PRIMARY CARE | Facility: CLINIC | Age: 61
End: 2021-02-15
Payer: COMMERCIAL

## 2021-02-15 VITALS
HEART RATE: 50 BPM | RESPIRATION RATE: 16 BRPM | DIASTOLIC BLOOD PRESSURE: 80 MMHG | TEMPERATURE: 98.1 F | HEIGHT: 70 IN | WEIGHT: 191 LBS | SYSTOLIC BLOOD PRESSURE: 120 MMHG | BODY MASS INDEX: 27.35 KG/M2 | OXYGEN SATURATION: 96 %

## 2021-02-15 DIAGNOSIS — I10 ESSENTIAL HYPERTENSION: Primary | ICD-10-CM

## 2021-02-15 DIAGNOSIS — Z12.11 SCREENING FOR COLON CANCER: ICD-10-CM

## 2021-02-15 PROCEDURE — 99213 OFFICE O/P EST LOW 20 MIN: CPT | Performed by: INTERNAL MEDICINE

## 2021-02-15 RX ORDER — MELOXICAM 7.5 MG/1
7.5 TABLET ORAL 2 TIMES DAILY
COMMUNITY
End: 2021-05-11 | Stop reason: SDUPTHER

## 2021-02-15 RX ORDER — LOSARTAN POTASSIUM 50 MG/1
50 TABLET ORAL DAILY
Qty: 90 TABLET | Refills: 3 | Status: SHIPPED | OUTPATIENT
Start: 2021-02-15 | End: 2021-05-11 | Stop reason: SDUPTHER

## 2021-02-15 RX ORDER — MINERAL OIL
180 ENEMA (ML) RECTAL DAILY
COMMUNITY
End: 2021-05-11 | Stop reason: SDUPTHER

## 2021-02-15 ASSESSMENT — ENCOUNTER SYMPTOMS
DIARRHEA: 0
SHORTNESS OF BREATH: 0
HEADACHES: 0
COLOR CHANGE: 0
SORE THROAT: 0
ABDOMINAL PAIN: 0
APPETITE CHANGE: 0
LIGHT-HEADEDNESS: 0

## 2021-02-15 NOTE — PROGRESS NOTES
"      Subjective  f/u     Patient ID: Joseph Fletcher is a 60 y.o. male.  1960      HPI 61 y/o male presents for a follow up evaluation. Pt states he is now back on his Losartan. Pt has no other complaints.    The following have been reviewed and updated as appropriate in this visit:       Review of Systems   Constitutional: Negative for appetite change.   HENT: Negative for ear pain and sore throat.    Respiratory: Negative for shortness of breath.    Cardiovascular: Negative for chest pain and leg swelling.   Gastrointestinal: Negative for abdominal pain and diarrhea.   Endocrine: Negative for heat intolerance.   Genitourinary: Negative for urgency.   Skin: Negative for color change.   Neurological: Negative for light-headedness and headaches.       Objective     Vitals:    02/15/21 0909 02/15/21 0940   BP: 140/90 120/80   BP Location:  Right upper arm   Patient Position:  Lying   Pulse: (!) 50    Resp: 16    Temp: 36.7 °C (98.1 °F)    TempSrc: Oral    SpO2: 96%    Weight: 86.6 kg (191 lb)    Height: 1.778 m (5' 10\")      Body mass index is 27.41 kg/m².    Physical Exam  Vitals signs and nursing note reviewed.   Constitutional:       Appearance: He is well-developed.   HENT:      Head: Normocephalic and atraumatic.      Right Ear: External ear normal.      Left Ear: External ear normal.      Nose: Nose normal.   Eyes:      Conjunctiva/sclera: Conjunctivae normal.      Pupils: Pupils are equal, round, and reactive to light.   Neck:      Musculoskeletal: Normal range of motion and neck supple.   Cardiovascular:      Rate and Rhythm: Normal rate and regular rhythm.      Heart sounds: Normal heart sounds.   Pulmonary:      Effort: Pulmonary effort is normal.      Breath sounds: Normal breath sounds.   Abdominal:      General: Bowel sounds are normal.      Palpations: Abdomen is soft.   Musculoskeletal: Normal range of motion.   Skin:     General: Skin is warm and dry.   Neurological:      Mental Status: He is " alert and oriented to person, place, and time.         Assessment/Plan   Diagnoses and all orders for this visit:    Essential hypertension (Primary)  Comments:  Blood pressure okay continue losartan  Orders:  -     losartan (COZAAR) 50 mg tablet; Take 1 tablet (50 mg total) by mouth daily.    Screening for colon cancer  -     Ambulatory referral to Gastroenterology; Future        By signing my name below, IALPHONSOValeriy Keatingi, attest that this documentation has been prepared under the direction and in the presence of Nic Cochran MD      2/15/2021 10:14 AM     I, Nic Cochran MD, personally performed the services described in this documentation, as documented by the scribe in my presence, and it is both accurate and complete.  2/15/2021 10:14 AM

## 2021-05-10 ENCOUNTER — RX ONLY (OUTPATIENT)
Age: 61
Setting detail: RX ONLY
End: 2021-05-10

## 2021-05-10 DIAGNOSIS — I10 ESSENTIAL HYPERTENSION: ICD-10-CM

## 2021-05-10 RX ORDER — SALICYLIC ACID 3 G/100G
LOTION TOPICAL BID
Qty: 30 | Refills: 3 | Status: ERX

## 2021-05-10 NOTE — TELEPHONE ENCOUNTER
Do you have enough medication for the next 5 days?: no    Did you request this medication through your pharmacy or our patient portal in the last day or two? yes    Name of medication requested: fexofenadine (ALLEGRA) HCL  Medication Strength: 180 MG  Mediation Directions:   Quantity Requested (example 30/90):   Number of refills requested:       System Generated Preferred Pharmacy(s)  are as follows.  If more than one pharmacy displays please identify which one should be used for this call    Has the pharmacy information below been confirmed with the patient?         [id number] [address] AT [address] [place] [address]  Phone: [phone] Fax: [phone]          If necessary, provide pharmacy details below.     Is this pharmacy a mail order pharmacy?:   Pharmacy Name:   Pharmacy City:   Pharmacy Telephone:     Additional Comments:    Next Encounter with this provider: 8/16/2021

## 2021-05-10 NOTE — TELEPHONE ENCOUNTER
Do you have enough medication for the next 5 days?: YES    Did you request this medication through your pharmacy or our patient portal in the last day or two? NO    Name of medication requested: losartan (COZAAR) 50 MG  meloxicam (MOBIC) 7.5 mg tablet  Medication Strength:   Mediation Directions:   Quantity Requested (example 30/90):   Number of refills requested:       System Generated Preferred Pharmacy(s)  are as follows.  If more than one pharmacy displays please identify which one should be used for this call    Has the pharmacy information below been confirmed with the patient?      [id number]  [address] [place] [address]  PHONE: [phone]             If necessary, provide pharmacy details below.     Is this pharmacy a mail order pharmacy?:   Pharmacy Name:   Pharmacy City:   Pharmacy Telephone:     Additional Comments:    Next Encounter with this provider: 8/16/2021

## 2021-05-11 RX ORDER — MELOXICAM 7.5 MG/1
7.5 TABLET ORAL 2 TIMES DAILY
Qty: 180 TABLET | Refills: 3 | Status: SHIPPED | OUTPATIENT
Start: 2021-05-11 | End: 2021-06-01 | Stop reason: SDUPTHER

## 2021-05-11 RX ORDER — MINERAL OIL
180 ENEMA (ML) RECTAL DAILY
Qty: 90 TABLET | Refills: 3 | Status: SHIPPED | OUTPATIENT
Start: 2021-05-11 | End: 2021-06-01 | Stop reason: SDUPTHER

## 2021-05-11 RX ORDER — LOSARTAN POTASSIUM 50 MG/1
50 TABLET ORAL DAILY
Qty: 90 TABLET | Refills: 3 | Status: SHIPPED | OUTPATIENT
Start: 2021-05-11 | End: 2021-06-01 | Stop reason: SDUPTHER

## 2021-05-11 NOTE — TELEPHONE ENCOUNTER
Medicine Refill Request    Last Office Visit: 02/15/2021  Last Telemedicine Visit: Visit date not found    Next Office Visit: Visit date not found  Next Telemedicine Visit: Visit date not found         Current Outpatient Medications:   •  aspirin 81 mg enteric coated tablet, 81 mg., Disp: , Rfl:   •  cetirizine (ZyrTEC) 10 mg tablet, 10 mg., Disp: , Rfl:   •  cholecalciferol, vitamin D3, (VITAMIN D3) 1,000 unit capsule, No SIG Entered, Disp: , Rfl:   •  fexofenadine (ALLEGRA) 180 mg tablet, Take 180 mg by mouth daily., Disp: , Rfl:   •  guaiFENesin (MUCINEX) 600 mg 12 hr tablet, Take 1,200 mg by mouth 2 (two) times a day., Disp: , Rfl:   •  ipratropium (ATROVENT) 0.03 % nasal spray, Administer 2 sprays into each nostril 2 (two) times a day., Disp: , Rfl: 3  •  lisinopriL (PRINIVIL) 10 mg tablet, Take 1 tablet (10 mg total) by mouth daily. (Patient not taking: Reported on 2/15/2021 ), Disp: 30 tablet, Rfl: 1  •  losartan (COZAAR) 50 mg tablet, Take 1 tablet (50 mg total) by mouth daily., Disp: 90 tablet, Rfl: 3  •  meloxicam (MOBIC) 7.5 mg tablet, Take 7.5 mg by mouth 2 (two) times a day., Disp: , Rfl:   •  montelukast (SINGULAIR) 10 mg tablet, Take 10 mg by mouth once daily., Disp: , Rfl: 3  •  multivitamin (THERAGRAN) tablet, take 1 tablet by oral route  every day with food, Disp: , Rfl:       BP Readings from Last 3 Encounters:   02/15/21 120/80   12/14/20 120/80   06/03/19 122/70       Recent Lab results:  Lab Results   Component Value Date    CHOL 164 06/05/2020   ,   Lab Results   Component Value Date    HDL 52 06/05/2020   ,   Lab Results   Component Value Date    LDLCALC 98 06/05/2020   ,   Lab Results   Component Value Date    TRIG 53 06/05/2020        Lab Results   Component Value Date    GLUCOSE 82 06/05/2020    GLUCOSE NEGATIVE 06/05/2020   ,   Lab Results   Component Value Date    HGBA1C 5.3 06/03/2019         Lab Results   Component Value Date    CREATININE 1.23 06/05/2020       Lab Results   Component  Value Date    TSH 1.72 06/03/2019

## 2021-05-11 NOTE — TELEPHONE ENCOUNTER
Medicine Refill Request    Last Office Visit: Visit date not found  Last Telemedicine Visit: Visit date not found    Next Office Visit: Visit date not found  Next Telemedicine Visit: Visit date not found         Current Outpatient Medications:   •  aspirin 81 mg enteric coated tablet, 81 mg., Disp: , Rfl:   •  cetirizine (ZyrTEC) 10 mg tablet, 10 mg., Disp: , Rfl:   •  cholecalciferol, vitamin D3, (VITAMIN D3) 1,000 unit capsule, No SIG Entered, Disp: , Rfl:   •  fexofenadine (ALLEGRA) 180 mg tablet, Take 180 mg by mouth daily., Disp: , Rfl:   •  guaiFENesin (MUCINEX) 600 mg 12 hr tablet, Take 1,200 mg by mouth 2 (two) times a day., Disp: , Rfl:   •  ipratropium (ATROVENT) 0.03 % nasal spray, Administer 2 sprays into each nostril 2 (two) times a day., Disp: , Rfl: 3  •  lisinopriL (PRINIVIL) 10 mg tablet, Take 1 tablet (10 mg total) by mouth daily. (Patient not taking: Reported on 2/15/2021 ), Disp: 30 tablet, Rfl: 1  •  losartan (COZAAR) 50 mg tablet, Take 1 tablet (50 mg total) by mouth daily., Disp: 90 tablet, Rfl: 3  •  meloxicam (MOBIC) 7.5 mg tablet, Take 7.5 mg by mouth 2 (two) times a day., Disp: , Rfl:   •  montelukast (SINGULAIR) 10 mg tablet, Take 10 mg by mouth once daily., Disp: , Rfl: 3  •  multivitamin (THERAGRAN) tablet, take 1 tablet by oral route  every day with food, Disp: , Rfl:       BP Readings from Last 3 Encounters:   02/15/21 120/80   12/14/20 120/80   06/03/19 122/70       Recent Lab results:  Lab Results   Component Value Date    CHOL 164 06/05/2020   ,   Lab Results   Component Value Date    HDL 52 06/05/2020   ,   Lab Results   Component Value Date    LDLCALC 98 06/05/2020   ,   Lab Results   Component Value Date    TRIG 53 06/05/2020        Lab Results   Component Value Date    GLUCOSE 82 06/05/2020    GLUCOSE NEGATIVE 06/05/2020   ,   Lab Results   Component Value Date    HGBA1C 5.3 06/03/2019         Lab Results   Component Value Date    CREATININE 1.23 06/05/2020       Lab Results    Component Value Date    TSH 1.72 06/03/2019

## 2021-05-29 DIAGNOSIS — I10 ESSENTIAL HYPERTENSION: ICD-10-CM

## 2021-05-29 RX ORDER — LOSARTAN POTASSIUM 50 MG/1
50 TABLET ORAL DAILY
Qty: 90 TABLET | Refills: 3 | Status: CANCELLED | OUTPATIENT
Start: 2021-05-29 | End: 2021-08-27

## 2021-05-29 RX ORDER — MELOXICAM 7.5 MG/1
7.5 TABLET ORAL 2 TIMES DAILY
Qty: 180 TABLET | Refills: 3 | Status: CANCELLED | OUTPATIENT
Start: 2021-05-29 | End: 2021-08-27

## 2021-05-29 RX ORDER — MINERAL OIL
180 ENEMA (ML) RECTAL DAILY
Qty: 90 TABLET | Refills: 3 | Status: CANCELLED | OUTPATIENT
Start: 2021-05-29 | End: 2021-08-27

## 2021-06-01 ENCOUNTER — TELEPHONE (OUTPATIENT)
Dept: PRIMARY CARE | Facility: CLINIC | Age: 61
End: 2021-06-01

## 2021-06-01 RX ORDER — MINERAL OIL
180 ENEMA (ML) RECTAL DAILY
Qty: 90 TABLET | Refills: 3 | Status: SHIPPED | OUTPATIENT
Start: 2021-06-01 | End: 2022-06-14 | Stop reason: SDUPTHER

## 2021-06-01 RX ORDER — LOSARTAN POTASSIUM 50 MG/1
50 TABLET ORAL DAILY
Qty: 90 TABLET | Refills: 3 | Status: SHIPPED | OUTPATIENT
Start: 2021-06-01 | End: 2022-07-06 | Stop reason: SDUPTHER

## 2021-06-01 RX ORDER — MELOXICAM 7.5 MG/1
7.5 TABLET ORAL 2 TIMES DAILY
Qty: 180 TABLET | Refills: 3 | Status: SHIPPED | OUTPATIENT
Start: 2021-06-01 | End: 2021-10-06 | Stop reason: SDUPTHER

## 2021-06-01 NOTE — TELEPHONE ENCOUNTER
Pt called to see if medication was sent to the pharmacy.  Called pharmacy and they never received request dated 5/11.  Pt will be out of medication by 6/2

## 2021-06-01 NOTE — TELEPHONE ENCOUNTER
Pt's insurance co called stating that his insurance only cover 1 tablet a day or he can cut the tablet in half to get the 7.5     meloxicam (MOBIC) 7.5 mg tablet    You can call [phone] for a quantity limit authorization.

## 2021-09-14 ENCOUNTER — TELEPHONE (OUTPATIENT)
Dept: PRIMARY CARE | Facility: CLINIC | Age: 61
End: 2021-09-14

## 2021-09-14 NOTE — TELEPHONE ENCOUNTER
Pt is experiencing shoulder and neck pain. Pt wants  or a nurse can give him a call back. Pt wants to have a anti inflammation patch sent to his pharmacy.

## 2021-09-17 ENCOUNTER — OFFICE VISIT (OUTPATIENT)
Dept: PRIMARY CARE | Facility: CLINIC | Age: 61
End: 2021-09-17
Payer: COMMERCIAL

## 2021-09-17 VITALS
DIASTOLIC BLOOD PRESSURE: 80 MMHG | HEART RATE: 52 BPM | RESPIRATION RATE: 18 BRPM | HEIGHT: 70 IN | WEIGHT: 189 LBS | OXYGEN SATURATION: 99 % | BODY MASS INDEX: 27.06 KG/M2 | SYSTOLIC BLOOD PRESSURE: 140 MMHG | TEMPERATURE: 97 F

## 2021-09-17 DIAGNOSIS — I10 ESSENTIAL HYPERTENSION: ICD-10-CM

## 2021-09-17 DIAGNOSIS — M25.512 ACUTE PAIN OF LEFT SHOULDER: Primary | ICD-10-CM

## 2021-09-17 PROBLEM — R49.0 HOARSENESS: Status: ACTIVE | Noted: 2021-09-17

## 2021-09-17 PROBLEM — H60.90 OTITIS EXTERNA: Status: ACTIVE | Noted: 2021-09-17

## 2021-09-17 PROBLEM — H52.00 HYPERMETROPIA: Status: ACTIVE | Noted: 2021-09-17

## 2021-09-17 PROBLEM — B36.9 SUPERFICIAL MYCOSIS: Status: ACTIVE | Noted: 2021-09-17

## 2021-09-17 PROBLEM — K21.9 LARYNGOPHARYNGEAL REFLUX: Status: ACTIVE | Noted: 2021-09-17

## 2021-09-17 PROBLEM — J30.9 ALLERGIC RHINITIS: Status: ACTIVE | Noted: 2021-09-17

## 2021-09-17 PROCEDURE — 3077F SYST BP >= 140 MM HG: CPT | Performed by: INTERNAL MEDICINE

## 2021-09-17 PROCEDURE — 99213 OFFICE O/P EST LOW 20 MIN: CPT | Performed by: INTERNAL MEDICINE

## 2021-09-17 PROCEDURE — 3008F BODY MASS INDEX DOCD: CPT | Performed by: INTERNAL MEDICINE

## 2021-09-17 PROCEDURE — 3079F DIAST BP 80-89 MM HG: CPT | Performed by: INTERNAL MEDICINE

## 2021-09-17 RX ORDER — LIDOCAINE 50 MG/G
1 PATCH TOPICAL DAILY
Qty: 30 PATCH | Refills: 1 | Status: SHIPPED | OUTPATIENT
Start: 2021-09-17 | End: 2023-05-12 | Stop reason: ALTCHOICE

## 2021-09-17 NOTE — TELEPHONE ENCOUNTER
Spoke with  today and he stated he was seen by  today and that  recommended a Lidoderm 5% patch and  has a appointment to see a orthopedic surgeon.

## 2021-09-17 NOTE — PROGRESS NOTES
"      Subjective      Patient ID: Joseph Feltcher is a 61 y.o. male.  1960      HPI  Patient presents to the office complaining of left shoulder pain radiating down to the left upper arm.  He feels his pain starts in the region of neck and then radiates down.  Patient symptoms has been present for the last 1 week and although not gotten worse they persist.  Patient denied any history of injury trauma or sprain to the neck area no history of trauma to left shoulder  No history of left shoulder swelling.  His pain is not continuous but intermittent in nature  He has an appointment to be seen by orthopedic surgery next week.  He is very active plays tennis regularly and continues to do that in spite of this left shoulder pain  He offers no other complaints and generally feels well  No history of fever chills cough shortness of breath wheezing  No abdominal pain no nausea vomiting diarrhea.  The following have been reviewed and updated as appropriate in this visit:       Review of Systems   All other systems reviewed and are negative.    Medications  All current medications reviewed and discussed with patient.  Objective     Vitals:    09/17/21 0800   BP: 140/80   Pulse: (!) 52   Resp: 18   Temp: 36.1 °C (97 °F)   SpO2: 99%   Weight: 85.7 kg (189 lb)   Height: 1.778 m (5' 10\")     Body mass index is 27.12 kg/m².    Physical Exam  Vitals reviewed.   Constitutional:       Appearance: He is well-developed.   HENT:      Head: Normocephalic and atraumatic.   Eyes:      Pupils: Pupils are equal, round, and reactive to light.   Cardiovascular:      Rate and Rhythm: Normal rate and regular rhythm.      Heart sounds: Normal heart sounds.   Pulmonary:      Effort: Pulmonary effort is normal.      Breath sounds: Normal breath sounds.   Abdominal:      General: Bowel sounds are normal. There is no distension.      Palpations: Abdomen is soft. There is no mass.      Tenderness: There is no abdominal tenderness. There is no " guarding.   Musculoskeletal:         General: Normal range of motion.      Cervical back: Normal range of motion and neck supple.      Comments: Left shoulder  No swelling no localized tenderness no effusion  Shoulder movements nearly normal minimal restriction in abduction  Neck movements full without any limitation  No localized tenderness in the neck area.   Skin:     General: Skin is warm and dry.   Neurological:      Mental Status: He is alert and oriented to person, place, and time.      Cranial Nerves: No cranial nerve deficit.         Assessment/Plan   Diagnoses and all orders for this visit:    Acute pain of left shoulder (Primary)    Essential hypertension    Other orders  -     lidocaine (LIDODERM) 5 % patch; Place 1 patch on the skin daily. Remove & discard patch within 12 hours or as directed by prescriber.      Patient has an appointment to be seen by orthopedic surgery.  I wanted to send him for imaging studies but he wants to get it done from orthopedic surgery.  For relief of symptoms I gave him Lidoderm patch 5% he will continue to take Mobic as before for pain.

## 2021-09-20 ENCOUNTER — TELEPHONE (OUTPATIENT)
Dept: PRIMARY CARE | Facility: CLINIC | Age: 61
End: 2021-09-20

## 2021-09-20 NOTE — TELEPHONE ENCOUNTER
"Pt called and is requesting another referral for an orthopedic specialist.    States that he was referred to [facility] but is unable to get an appt.  Pt is looking for another orthopedic specialist that is \"less popular.\"    Please call pt back at [phone] to advise.    Also advised pt to call insurance to find a specialist that is in-network as well if unable to wait for a call back.  "

## 2021-10-06 ENCOUNTER — TELEPHONE (OUTPATIENT)
Dept: PRIMARY CARE | Facility: CLINIC | Age: 61
End: 2021-10-06

## 2021-10-06 ENCOUNTER — OFFICE VISIT (OUTPATIENT)
Dept: PRIMARY CARE | Facility: CLINIC | Age: 61
End: 2021-10-06
Payer: COMMERCIAL

## 2021-10-06 VITALS
WEIGHT: 185 LBS | HEIGHT: 70 IN | BODY MASS INDEX: 26.48 KG/M2 | OXYGEN SATURATION: 98 % | HEART RATE: 58 BPM | SYSTOLIC BLOOD PRESSURE: 120 MMHG | RESPIRATION RATE: 18 BRPM | TEMPERATURE: 98.9 F | DIASTOLIC BLOOD PRESSURE: 80 MMHG

## 2021-10-06 DIAGNOSIS — Z00.00 PHYSICAL EXAM: ICD-10-CM

## 2021-10-06 DIAGNOSIS — I10 ESSENTIAL HYPERTENSION: Primary | ICD-10-CM

## 2021-10-06 PROCEDURE — 3079F DIAST BP 80-89 MM HG: CPT | Performed by: INTERNAL MEDICINE

## 2021-10-06 PROCEDURE — 3008F BODY MASS INDEX DOCD: CPT | Performed by: INTERNAL MEDICINE

## 2021-10-06 PROCEDURE — 3074F SYST BP LT 130 MM HG: CPT | Performed by: INTERNAL MEDICINE

## 2021-10-06 PROCEDURE — 99396 PREV VISIT EST AGE 40-64: CPT | Performed by: INTERNAL MEDICINE

## 2021-10-06 RX ORDER — MELOXICAM 7.5 MG/1
7.5 TABLET ORAL DAILY
Qty: 90 TABLET | Refills: 0 | Status: SHIPPED | OUTPATIENT
Start: 2021-10-06 | End: 2022-02-22 | Stop reason: SDUPTHER

## 2021-10-06 RX ORDER — MELOXICAM 7.5 MG/1
7.5 TABLET ORAL 2 TIMES DAILY
Qty: 180 TABLET | Refills: 3 | Status: SHIPPED | OUTPATIENT
Start: 2021-10-06 | End: 2021-10-06 | Stop reason: SDUPTHER

## 2021-10-06 ASSESSMENT — ENCOUNTER SYMPTOMS
DIARRHEA: 0
HEADACHES: 0
WHEEZING: 0
FREQUENCY: 0
NERVOUS/ANXIOUS: 0
CHILLS: 0
PALPITATIONS: 0
EYE PAIN: 0
ARTHRALGIAS: 1
BACK PAIN: 0
LIGHT-HEADEDNESS: 0
SHORTNESS OF BREATH: 0
SLEEP DISTURBANCE: 0
FEVER: 0
NAUSEA: 0
COUGH: 0
CONSTIPATION: 0
SORE THROAT: 0
DIZZINESS: 0
RHINORRHEA: 0
VOMITING: 0
DYSURIA: 0
ADENOPATHY: 0

## 2021-10-06 NOTE — TELEPHONE ENCOUNTER
Pharmacy called. The insurance company will not pay for the medication MELOXICAM with the dosage of being taken twice a day. They will only pay for it being given ONCE a day.     Pls re-submit a new prescripton.

## 2021-10-06 NOTE — PROGRESS NOTES
"      Subjective      Patient ID: Joseph Fletcher is a 61 y.o. male.  1960      Joseph Fletcher is a 61 year old male who presents for an annual physical examination. Pt reports of left shoulder pain. Pt notes of occasional canker sores. As a result pt has difficulty speaking.       The following have been reviewed and updated as appropriate in this visit:       Review of Systems   Constitutional: Negative for chills and fever.   HENT: Positive for mouth sores. Negative for congestion, ear pain, rhinorrhea and sore throat.    Eyes: Negative for pain.   Respiratory: Negative for cough, shortness of breath and wheezing.    Cardiovascular: Negative for chest pain and palpitations.   Gastrointestinal: Negative for constipation, diarrhea, nausea and vomiting.   Genitourinary: Negative for dysuria and frequency.   Musculoskeletal: Positive for arthralgias (left shoulder pain). Negative for back pain.   Skin: Negative for rash.   Neurological: Negative for dizziness, light-headedness and headaches.   Hematological: Negative for adenopathy.   Psychiatric/Behavioral: Negative for behavioral problems and sleep disturbance. The patient is not nervous/anxious.        Objective     Vitals:    10/06/21 0958 10/06/21 1017   BP: 116/78 120/80   BP Location:  Right upper arm   Patient Position:  Sitting   Pulse: (!) 58    Resp: 18    Temp: 37.2 °C (98.9 °F)    TempSrc: Oral    SpO2: 98%    Weight: 83.9 kg (185 lb)    Height: 1.778 m (5' 10\")      Body mass index is 26.54 kg/m².    Physical Exam  Constitutional:       Appearance: Normal appearance.   HENT:      Head: Normocephalic.      Right Ear: Tympanic membrane normal.      Left Ear: Tympanic membrane normal.      Nose: Nose normal.   Eyes:      Pupils: Pupils are equal, round, and reactive to light.   Cardiovascular:      Rate and Rhythm: Normal rate and regular rhythm.   Pulmonary:      Effort: Pulmonary effort is normal.   Abdominal:      Palpations: Abdomen is soft. "   Musculoskeletal:         General: Normal range of motion.      Cervical back: Normal range of motion.      Right lower leg: No edema.      Left lower leg: No edema.   Neurological:      Mental Status: He is alert. Mental status is at baseline.   Psychiatric:         Mood and Affect: Mood normal.         Behavior: Behavior normal.       Assessment/Plan   Diagnoses and all orders for this visit:    Essential hypertension (Primary)  Comments:  Blood pressure okay  Orders:  -     Comprehensive metabolic panel; Future  -     Urinalysis with Reflex Culture; Future    Physical exam  Comments:  Exam normal check labs having some problems with left shoulder refer to orthopedics  Orders:  -     Comprehensive metabolic panel; Future  -     Lipid panel; Future  -     Urinalysis with Reflex Culture; Future  -     CBC and differential; Future  -     Basic metabolic panel; Future    Other orders  -     meloxicam (MOBIC) 7.5 mg tablet; Take 1 tablet (7.5 mg total) by mouth 2 (two) times a day.       Declined influenza vaccine. Recommended Pfizer booster injection. Labs ordered.     By signing my name below, I, Nancy Ozuna, attest that this documentation has been prepared under the direction and in the presence of Nic Cochran MD      10/6/2021 10:44 AM

## 2021-10-07 ENCOUNTER — APPOINTMENT (OUTPATIENT)
Dept: LAB | Facility: HOSPITAL | Age: 61
End: 2021-10-07
Attending: INTERNAL MEDICINE
Payer: COMMERCIAL

## 2021-10-07 DIAGNOSIS — Z00.00 PHYSICAL EXAM: ICD-10-CM

## 2021-10-07 DIAGNOSIS — I10 ESSENTIAL HYPERTENSION: ICD-10-CM

## 2021-10-07 LAB
ALBUMIN SERPL-MCNC: 3.5 G/DL (ref 3.4–5)
ALP SERPL-CCNC: 38 IU/L (ref 35–126)
ALT SERPL-CCNC: 24 IU/L (ref 16–63)
ANION GAP SERPL CALC-SCNC: 12 MEQ/L (ref 3–15)
AST SERPL-CCNC: 22 IU/L (ref 15–41)
BACTERIA URNS QL MICRO: NORMAL /HPF
BASOPHILS # BLD: 0.03 K/UL (ref 0.01–0.1)
BASOPHILS NFR BLD: 0.6 %
BILIRUB SERPL-MCNC: 0.8 MG/DL (ref 0.3–1.2)
BILIRUB UR QL STRIP.AUTO: NEGATIVE MG/DL
BUN SERPL-MCNC: 26 MG/DL (ref 8–20)
CALCIUM SERPL-MCNC: 9.1 MG/DL (ref 8.9–10.3)
CHLORIDE SERPL-SCNC: 105 MEQ/L (ref 98–109)
CHOLEST SERPL-MCNC: 163 MG/DL
CLARITY UR REFRACT.AUTO: CLEAR
CO2 SERPL-SCNC: 26 MEQ/L (ref 22–32)
COLOR UR AUTO: YELLOW
CREAT SERPL-MCNC: 1.1 MG/DL (ref 0.8–1.3)
DIFFERENTIAL METHOD BLD: NORMAL
EOSINOPHIL # BLD: 0.07 K/UL (ref 0.04–0.54)
EOSINOPHIL NFR BLD: 1.4 %
ERYTHROCYTE [DISTWIDTH] IN BLOOD BY AUTOMATED COUNT: 12.5 % (ref 11.6–14.4)
GFR SERPL CREATININE-BSD FRML MDRD: >60 ML/MIN/1.73M*2
GLUCOSE SERPL-MCNC: 109 MG/DL (ref 70–99)
GLUCOSE UR STRIP.AUTO-MCNC: NEGATIVE MG/DL
HCT VFR BLDCO AUTO: 42 % (ref 40.1–51)
HDLC SERPL-MCNC: 58 MG/DL
HDLC SERPL: 2.8 {RATIO}
HGB BLD-MCNC: 14.3 G/DL (ref 13.7–17.5)
HGB UR QL STRIP.AUTO: 3
HYALINE CASTS #/AREA URNS LPF: NORMAL /LPF
IMM GRANULOCYTES # BLD AUTO: 0.01 K/UL (ref 0–0.08)
IMM GRANULOCYTES NFR BLD AUTO: 0.2 %
KETONES UR STRIP.AUTO-MCNC: NEGATIVE MG/DL
LDLC SERPL CALC-MCNC: 100 MG/DL
LEUKOCYTE ESTERASE UR QL STRIP.AUTO: NEGATIVE
LYMPHOCYTES # BLD: 1.42 K/UL (ref 1.2–3.5)
LYMPHOCYTES NFR BLD: 29.3 %
MCH RBC QN AUTO: 31.2 PG (ref 28–33.2)
MCHC RBC AUTO-ENTMCNC: 34 G/DL (ref 32.2–36.5)
MCV RBC AUTO: 91.5 FL (ref 83–98)
MONOCYTES # BLD: 0.41 K/UL (ref 0.3–1)
MONOCYTES NFR BLD: 8.5 %
NEUTROPHILS # BLD: 2.9 K/UL (ref 1.7–7)
NEUTS SEG NFR BLD: 60 %
NITRITE UR QL STRIP.AUTO: NEGATIVE
NONHDLC SERPL-MCNC: 105 MG/DL
NRBC BLD-RTO: 0 %
PDW BLD AUTO: 10.5 FL (ref 9.4–12.4)
PH UR STRIP.AUTO: 6 [PH]
PLATELET # BLD AUTO: 206 K/UL (ref 150–350)
POTASSIUM SERPL-SCNC: 4.1 MEQ/L (ref 3.6–5.1)
PROT SERPL-MCNC: 6 G/DL (ref 6–8.2)
PROT UR QL STRIP.AUTO: NEGATIVE
RBC # BLD AUTO: 4.59 M/UL (ref 4.5–5.8)
RBC #/AREA URNS HPF: NORMAL /HPF
SODIUM SERPL-SCNC: 143 MEQ/L (ref 136–144)
SP GR UR REFRACT.AUTO: 1.02
SQUAMOUS URNS QL MICRO: NORMAL /HPF
TRIGL SERPL-MCNC: 25 MG/DL (ref 30–149)
UROBILINOGEN UR STRIP-ACNC: 1 EU/DL
WBC # BLD AUTO: 4.84 K/UL (ref 3.8–10.5)
WBC #/AREA URNS HPF: NORMAL /HPF

## 2021-10-07 PROCEDURE — 80053 COMPREHEN METABOLIC PANEL: CPT

## 2021-10-07 PROCEDURE — 80061 LIPID PANEL: CPT

## 2021-10-07 PROCEDURE — 81003 URINALYSIS AUTO W/O SCOPE: CPT

## 2021-10-07 PROCEDURE — 85025 COMPLETE CBC W/AUTO DIFF WBC: CPT

## 2021-10-07 PROCEDURE — 36415 COLL VENOUS BLD VENIPUNCTURE: CPT

## 2021-10-07 PROCEDURE — 81001 URINALYSIS AUTO W/SCOPE: CPT

## 2021-10-18 ENCOUNTER — TELEPHONE (OUTPATIENT)
Dept: PRIMARY CARE | Facility: CLINIC | Age: 61
End: 2021-10-18

## 2021-10-18 NOTE — TELEPHONE ENCOUNTER
Pt had blood work done on 10/7 and would like to discuss results.  Pt is off from work on 10/18 and 10/19 and would like a call back before 10/20

## 2021-11-22 DIAGNOSIS — N40.0 BENIGN PROSTATIC HYPERPLASIA, UNSPECIFIED WHETHER LOWER URINARY TRACT SYMPTOMS PRESENT: Primary | ICD-10-CM

## 2021-12-14 ENCOUNTER — APPOINTMENT (OUTPATIENT)
Dept: LAB | Facility: HOSPITAL | Age: 61
End: 2021-12-14
Attending: INTERNAL MEDICINE
Payer: COMMERCIAL

## 2021-12-14 DIAGNOSIS — N40.0 BENIGN PROSTATIC HYPERPLASIA, UNSPECIFIED WHETHER LOWER URINARY TRACT SYMPTOMS PRESENT: ICD-10-CM

## 2021-12-14 LAB — PSA SERPL-MCNC: 1.76 NG/ML

## 2021-12-14 PROCEDURE — 36415 COLL VENOUS BLD VENIPUNCTURE: CPT

## 2021-12-14 PROCEDURE — 84153 ASSAY OF PSA TOTAL: CPT

## 2022-02-08 ENCOUNTER — TELEPHONE (OUTPATIENT)
Dept: PRIMARY CARE | Facility: CLINIC | Age: 62
End: 2022-02-08
Payer: COMMERCIAL

## 2022-02-08 DIAGNOSIS — M25.512 ACUTE PAIN OF LEFT SHOULDER: Primary | ICD-10-CM

## 2022-02-08 NOTE — TELEPHONE ENCOUNTER
Patient is requesting a non-specific script for physical therapy. Patient states he has had problems and has an clinton't next week.  Last OV was 10/21 for EPP

## 2022-02-22 RX ORDER — MELOXICAM 7.5 MG/1
7.5 TABLET ORAL DAILY
Qty: 90 TABLET | Refills: 0 | Status: SHIPPED | OUTPATIENT
Start: 2022-02-22 | End: 2022-08-12

## 2022-02-22 NOTE — TELEPHONE ENCOUNTER
Medicine Refill Request    Last Office: 10/6/2021   Last Consult Visit: Visit date not found  Last Telemedicine Visit: Visit date not found    Next Appointment: Visit date not found      Current Outpatient Medications:   •  aspirin 81 mg enteric coated tablet, 81 mg., Disp: , Rfl:   •  cetirizine (ZyrTEC) 10 mg tablet, 10 mg., Disp: , Rfl:   •  cholecalciferol, vitamin D3, (VITAMIN D3) 1,000 unit capsule, No SIG Entered, Disp: , Rfl:   •  fexofenadine (ALLEGRA) 180 mg tablet, Take 1 tablet (180 mg total) by mouth daily., Disp: 90 tablet, Rfl: 3  •  guaiFENesin (MUCINEX) 600 mg 12 hr tablet, Take 1,200 mg by mouth 2 (two) times a day., Disp: , Rfl:   •  ipratropium (ATROVENT) 0.03 % nasal spray, Administer 2 sprays into each nostril 2 (two) times a day., Disp: , Rfl: 3  •  lidocaine (LIDODERM) 5 % patch, Place 1 patch on the skin daily. Remove & discard patch within 12 hours or as directed by prescriber., Disp: 30 patch, Rfl: 1  •  lisinopriL (PRINIVIL) 10 mg tablet, Take 1 tablet (10 mg total) by mouth daily. (Patient not taking: Reported on 2/15/2021 ), Disp: 30 tablet, Rfl: 1  •  losartan (COZAAR) 50 mg tablet, Take 1 tablet (50 mg total) by mouth daily., Disp: 90 tablet, Rfl: 3  •  meloxicam (MOBIC) 7.5 mg tablet, Take 1 tablet (7.5 mg total) by mouth daily., Disp: 90 tablet, Rfl: 0  •  montelukast (SINGULAIR) 10 mg tablet, Take 10 mg by mouth once daily., Disp: , Rfl: 3  •  multivitamin (THERAGRAN) tablet, take 1 tablet by oral route  every day with food, Disp: , Rfl:       BP Readings from Last 3 Encounters:   10/06/21 120/80   09/17/21 140/80   02/15/21 120/80       Recent Lab results:  Lab Results   Component Value Date    CHOL 163 10/07/2021   ,   Lab Results   Component Value Date    HDL 58 10/07/2021   ,   Lab Results   Component Value Date    LDLCALC 100 10/07/2021   ,   Lab Results   Component Value Date    TRIG 25 (L) 10/07/2021        Lab Results   Component Value Date    GLUCOSE 109 (H) 10/07/2021   ,    Lab Results   Component Value Date    HGBA1C 5.3 06/03/2019         Lab Results   Component Value Date    CREATININE 1.1 10/07/2021       Lab Results   Component Value Date    TSH 1.72 06/03/2019

## 2022-02-24 ENCOUNTER — TRANSCRIBE ORDERS (OUTPATIENT)
Dept: SCHEDULING | Age: 62
End: 2022-02-24

## 2022-02-24 DIAGNOSIS — M25.562 PAIN IN LEFT KNEE: Primary | ICD-10-CM

## 2022-02-24 DIAGNOSIS — M23.92 UNSPECIFIED INTERNAL DERANGEMENT OF LEFT KNEE: ICD-10-CM

## 2022-03-04 ENCOUNTER — APPOINTMENT (OUTPATIENT)
Dept: RADIOLOGY | Age: 62
End: 2022-03-04
Attending: ORTHOPAEDIC SURGERY
Payer: COMMERCIAL

## 2022-03-04 DIAGNOSIS — M23.92 UNSPECIFIED INTERNAL DERANGEMENT OF LEFT KNEE: ICD-10-CM

## 2022-03-04 DIAGNOSIS — M25.562 PAIN IN LEFT KNEE: ICD-10-CM

## 2022-03-04 PROCEDURE — 73721 MRI JNT OF LWR EXTRE W/O DYE: CPT | Mod: LT

## 2022-06-06 ENCOUNTER — OFFICE VISIT (OUTPATIENT)
Dept: PRIMARY CARE | Facility: CLINIC | Age: 62
End: 2022-06-06
Payer: COMMERCIAL

## 2022-06-06 VITALS
DIASTOLIC BLOOD PRESSURE: 80 MMHG | HEIGHT: 70 IN | HEART RATE: 52 BPM | TEMPERATURE: 98.4 F | BODY MASS INDEX: 26.48 KG/M2 | WEIGHT: 185 LBS | OXYGEN SATURATION: 98 % | RESPIRATION RATE: 18 BRPM | SYSTOLIC BLOOD PRESSURE: 120 MMHG

## 2022-06-06 DIAGNOSIS — R05.9 COUGH: Primary | ICD-10-CM

## 2022-06-06 DIAGNOSIS — I10 ESSENTIAL HYPERTENSION: ICD-10-CM

## 2022-06-06 PROCEDURE — 99213 OFFICE O/P EST LOW 20 MIN: CPT | Performed by: INTERNAL MEDICINE

## 2022-06-06 PROCEDURE — 3074F SYST BP LT 130 MM HG: CPT | Performed by: INTERNAL MEDICINE

## 2022-06-06 PROCEDURE — 3079F DIAST BP 80-89 MM HG: CPT | Performed by: INTERNAL MEDICINE

## 2022-06-06 PROCEDURE — 3008F BODY MASS INDEX DOCD: CPT | Performed by: INTERNAL MEDICINE

## 2022-06-06 RX ORDER — METHYLPREDNISOLONE 4 MG/1
TABLET ORAL
Qty: 21 TABLET | Refills: 0 | Status: SHIPPED | OUTPATIENT
Start: 2022-06-06 | End: 2022-06-13

## 2022-06-06 RX ORDER — FLUTICASONE PROPIONATE AND SALMETEROL 250; 50 UG/1; UG/1
1 POWDER RESPIRATORY (INHALATION) 2 TIMES DAILY
Qty: 60 EACH | Refills: 5 | Status: SHIPPED | OUTPATIENT
Start: 2022-06-06 | End: 2023-05-12 | Stop reason: SDUPTHER

## 2022-06-06 RX ORDER — FLUTICASONE PROPIONATE AND SALMETEROL 250; 50 UG/1; UG/1
1 POWDER RESPIRATORY (INHALATION) 2 TIMES DAILY
Qty: 60 EACH | Refills: 5 | Status: SHIPPED | OUTPATIENT
Start: 2022-06-06 | End: 2022-06-06

## 2022-06-06 ASSESSMENT — ENCOUNTER SYMPTOMS
FEVER: 0
DYSURIA: 0
APPETITE CHANGE: 0
DIZZINESS: 0
FREQUENCY: 0
BLOOD IN STOOL: 0
RHINORRHEA: 0
CHEST TIGHTNESS: 0
SHORTNESS OF BREATH: 0
DIAPHORESIS: 0
ARTHRALGIAS: 0
CONSTIPATION: 0
DIARRHEA: 0
NAUSEA: 0
HEADACHES: 0
BRUISES/BLEEDS EASILY: 0
UNEXPECTED WEIGHT CHANGE: 0
BACK PAIN: 0
SORE THROAT: 0
WHEEZING: 0
COUGH: 0
ACTIVITY CHANGE: 0
ABDOMINAL PAIN: 0
FATIGUE: 0
PALPITATIONS: 0
CHILLS: 0

## 2022-06-06 NOTE — PROGRESS NOTES
"      Subjective  Follow up     Patient ID: Joseph Fletcher is a 61 y.o. male.  1960      Joseph Fletcher is a 60 y/o male who presents for a follow up. Patient reports that he went to urgent care due to a persistant cough, and was given antibiotics. Patient reports that he cannot take a deep breath without coughing. Patient also reports that he is getting short of breath when playing tennis.       The following have been reviewed and updated as appropriate in this visit:          Review of Systems   Constitutional: Negative for activity change, appetite change, chills, diaphoresis, fatigue, fever and unexpected weight change.   HENT: Negative for congestion, ear pain, postnasal drip, rhinorrhea and sore throat.    Eyes: Negative for visual disturbance.   Respiratory: Negative for cough, chest tightness, shortness of breath and wheezing.    Cardiovascular: Negative for chest pain, palpitations and leg swelling.   Gastrointestinal: Negative for abdominal pain, blood in stool, constipation, diarrhea and nausea.   Genitourinary: Negative for dysuria, frequency and urgency.   Musculoskeletal: Negative for arthralgias and back pain.   Skin: Negative for rash.   Neurological: Negative for dizziness and headaches.   Hematological: Does not bruise/bleed easily.       Objective     Vitals:    06/06/22 0836 06/06/22 0904   BP: 136/70 120/80   BP Location: Left upper arm Right upper arm   Patient Position: Sitting Sitting   Pulse: (!) 52    Resp: 18    Temp: 36.9 °C (98.4 °F)    SpO2: 98%    Weight: 83.9 kg (185 lb)    Height: 1.778 m (5' 10\")      Body mass index is 26.54 kg/m².    Physical Exam  Constitutional:       Appearance: He is well-developed.   HENT:      Head: Normocephalic and atraumatic.      Nose: Nose normal.   Eyes:      Conjunctiva/sclera: Conjunctivae normal.      Pupils: Pupils are equal, round, and reactive to light.   Cardiovascular:      Rate and Rhythm: Normal rate and regular rhythm.      Heart sounds: " Normal heart sounds. No murmur heard.    No friction rub. No gallop.   Pulmonary:      Effort: Pulmonary effort is normal. No respiratory distress.      Breath sounds: Normal breath sounds. No wheezing or rales.      Comments: Scattered wheezes, coarse breath sounds  Chest:      Chest wall: No tenderness.   Musculoskeletal:         General: Normal range of motion.   Skin:     General: Skin is warm and dry.   Neurological:      Mental Status: He is alert and oriented to person, place, and time.      Cranial Nerves: No cranial nerve deficit.      Sensory: No sensory deficit.      Motor: No abnormal muscle tone.      Coordination: Coordination normal.      Deep Tendon Reflexes: Reflexes normal.         Assessment/Plan   Diagnoses and all orders for this visit:    Cough (Primary)  Comments:  Viral URI with reactive airways treat with Advair inhaler no help in a few days use Medrol Dosepak  Orders:  -     fluticasone propion-salmeteroL (ADVAIR DISKUS) 250-50 mcg/dose diskus inhaler; Inhale 1 puff 2 (two) times a day.   Rinse mouth with water after use to reduce aftertaste and incidence of candidiasis.   Do not swallow.  For patients not on a ventilator, a spacer is recommended to be used with this medication/inhaler.  -     methylPREDNISolone (MEDROL DOSEPACK) 4 mg tablet; Follow package directions.  -     Ambulatory referral to Allergy; Future    Essential hypertension  Comments:  Blood pressure okay      By signing my name below, I, Makeda Turner, attest that this documentation has been prepared under the direction and in the presence of Nic Cochran MD      6/6/2022 9:19 AM

## 2022-06-14 RX ORDER — MINERAL OIL
180 ENEMA (ML) RECTAL DAILY
Qty: 90 TABLET | Refills: 3 | Status: SHIPPED | OUTPATIENT
Start: 2022-06-14 | End: 2023-07-14 | Stop reason: SDUPTHER

## 2022-06-14 NOTE — TELEPHONE ENCOUNTER
Medicine Refill Request    Last Office: 6/6/2022   Last Consult Visit: Visit date not found  Last Telemedicine Visit: Visit date not found    Next Appointment: 6/15/2022      Current Outpatient Medications:   •  aspirin 81 mg enteric coated tablet, 81 mg., Disp: , Rfl:   •  cetirizine (ZyrTEC) 10 mg tablet, 10 mg., Disp: , Rfl:   •  cholecalciferol, vitamin D3, 25 mcg (1,000 unit) capsule, No SIG Entered, Disp: , Rfl:   •  fexofenadine (ALLEGRA) 180 mg tablet, Take 1 tablet (180 mg total) by mouth daily., Disp: 90 tablet, Rfl: 3  •  fluticasone propion-salmeteroL (ADVAIR DISKUS) 250-50 mcg/dose diskus inhaler, Inhale 1 puff 2 (two) times a day., Disp: 60 each, Rfl: 5  •  guaiFENesin (MUCINEX) 600 mg 12 hr tablet, Take 1,200 mg by mouth 2 (two) times a day., Disp: , Rfl:   •  ipratropium (ATROVENT) 0.03 % nasal spray, Administer 2 sprays into each nostril 2 (two) times a day., Disp: , Rfl: 3  •  lidocaine (LIDODERM) 5 % patch, Place 1 patch on the skin daily. Remove & discard patch within 12 hours or as directed by prescriber., Disp: 30 patch, Rfl: 1  •  lisinopriL (PRINIVIL) 10 mg tablet, Take 1 tablet (10 mg total) by mouth daily. (Patient not taking: Reported on 2/15/2021 ), Disp: 30 tablet, Rfl: 1  •  losartan (COZAAR) 50 mg tablet, Take 1 tablet (50 mg total) by mouth daily., Disp: 90 tablet, Rfl: 3  •  meloxicam (MOBIC) 7.5 mg tablet, Take 1 tablet (7.5 mg total) by mouth daily., Disp: 90 tablet, Rfl: 0  •  montelukast (SINGULAIR) 10 mg tablet, Take 10 mg by mouth once daily., Disp: , Rfl: 3  •  multivitamin (THERAGRAN) tablet, take 1 tablet by oral route  every day with food, Disp: , Rfl:       BP Readings from Last 3 Encounters:   06/06/22 120/80   10/06/21 120/80   09/17/21 140/80       Recent Lab results:  Lab Results   Component Value Date    CHOL 163 10/07/2021   ,   Lab Results   Component Value Date    HDL 58 10/07/2021   ,   Lab Results   Component Value Date    LDLCALC 100 10/07/2021   ,   Lab Results    Component Value Date    TRIG 25 (L) 10/07/2021        Lab Results   Component Value Date    GLUCOSE 109 (H) 10/07/2021   ,   Lab Results   Component Value Date    HGBA1C 5.3 06/03/2019         Lab Results   Component Value Date    CREATININE 1.1 10/07/2021       Lab Results   Component Value Date    TSH 1.72 06/03/2019

## 2022-06-15 ENCOUNTER — TELEPHONE (OUTPATIENT)
Dept: PRIMARY CARE | Facility: CLINIC | Age: 62
End: 2022-06-15

## 2022-06-15 ENCOUNTER — OFFICE VISIT (OUTPATIENT)
Dept: PRIMARY CARE | Facility: CLINIC | Age: 62
End: 2022-06-15
Payer: COMMERCIAL

## 2022-06-15 ENCOUNTER — HOSPITAL ENCOUNTER (OUTPATIENT)
Dept: RADIOLOGY | Facility: HOSPITAL | Age: 62
Discharge: HOME | End: 2022-06-15
Attending: INTERNAL MEDICINE
Payer: COMMERCIAL

## 2022-06-15 VITALS
HEIGHT: 70 IN | TEMPERATURE: 97.8 F | OXYGEN SATURATION: 97 % | HEART RATE: 57 BPM | SYSTOLIC BLOOD PRESSURE: 120 MMHG | RESPIRATION RATE: 18 BRPM | DIASTOLIC BLOOD PRESSURE: 80 MMHG | WEIGHT: 182.2 LBS | BODY MASS INDEX: 26.08 KG/M2

## 2022-06-15 DIAGNOSIS — R05.9 COUGH: Primary | ICD-10-CM

## 2022-06-15 DIAGNOSIS — R05.9 COUGH: ICD-10-CM

## 2022-06-15 PROCEDURE — 3079F DIAST BP 80-89 MM HG: CPT | Performed by: INTERNAL MEDICINE

## 2022-06-15 PROCEDURE — 99213 OFFICE O/P EST LOW 20 MIN: CPT | Performed by: INTERNAL MEDICINE

## 2022-06-15 PROCEDURE — 3074F SYST BP LT 130 MM HG: CPT | Performed by: INTERNAL MEDICINE

## 2022-06-15 PROCEDURE — 3008F BODY MASS INDEX DOCD: CPT | Performed by: INTERNAL MEDICINE

## 2022-06-15 PROCEDURE — 71046 X-RAY EXAM CHEST 2 VIEWS: CPT

## 2022-06-15 ASSESSMENT — ENCOUNTER SYMPTOMS
HEADACHES: 0
PALPITATIONS: 0
APPETITE CHANGE: 0
SHORTNESS OF BREATH: 0
UNEXPECTED WEIGHT CHANGE: 0
BRUISES/BLEEDS EASILY: 0
BLOOD IN STOOL: 0
WHEEZING: 0
BACK PAIN: 0
FREQUENCY: 0
CHEST TIGHTNESS: 0
COUGH: 0
DIZZINESS: 0
CHILLS: 0
SORE THROAT: 0
ACTIVITY CHANGE: 0
RHINORRHEA: 0
ABDOMINAL PAIN: 0
DIAPHORESIS: 0
ARTHRALGIAS: 0
FATIGUE: 0
DYSURIA: 0
NAUSEA: 0
CONSTIPATION: 0
FEVER: 0
DIARRHEA: 0

## 2022-06-15 NOTE — TELEPHONE ENCOUNTER
Patient came into the office @2:30pm stating he just had an xray & that it will take an hour for the results to go to his pcp.  Patient would like results/recommendations, etc. Uploaded into my chart or receive a phone call  [phone]

## 2022-06-15 NOTE — PROGRESS NOTES
"      Subjective  Follow up     Patient ID: Joseph Fletcher is a 61 y.o. male.  1960      Joseph Fletcher is a 62 y/o male who presents for a follow up. Patient reports that he has a persistent cough. Patient reports that the cough has not gotten any worse since last visit. Patient reports that the best way to describe the cough is like asthma.       The following have been reviewed and updated as appropriate in this visit:          Review of Systems   Constitutional: Negative for activity change, appetite change, chills, diaphoresis, fatigue, fever and unexpected weight change.   HENT: Negative for congestion, ear pain, postnasal drip, rhinorrhea and sore throat.    Eyes: Negative for visual disturbance.   Respiratory: Negative for cough, chest tightness, shortness of breath and wheezing.    Cardiovascular: Negative for chest pain, palpitations and leg swelling.   Gastrointestinal: Negative for abdominal pain, blood in stool, constipation, diarrhea and nausea.   Genitourinary: Negative for dysuria, frequency and urgency.   Musculoskeletal: Negative for arthralgias and back pain.   Skin: Negative for rash.   Neurological: Negative for dizziness and headaches.   Hematological: Does not bruise/bleed easily.       Objective     Vitals:    06/15/22 1249 06/15/22 1330   BP: 118/68 120/80   BP Location:  Right upper arm   Patient Position:  Sitting   Pulse: (!) 57    Resp: 18    Temp: 36.6 °C (97.8 °F)    TempSrc: Oral    SpO2: 97%    Weight: 82.6 kg (182 lb 3.2 oz)    Height: 1.778 m (5' 10\")      Body mass index is 26.14 kg/m².    Physical Exam  Constitutional:       Appearance: He is well-developed.   HENT:      Head: Normocephalic and atraumatic.      Nose: Nose normal.   Eyes:      Conjunctiva/sclera: Conjunctivae normal.      Pupils: Pupils are equal, round, and reactive to light.   Cardiovascular:      Rate and Rhythm: Normal rate and regular rhythm.      Heart sounds: Normal heart sounds. No murmur heard.    No " friction rub. No gallop.   Pulmonary:      Effort: Pulmonary effort is normal. No respiratory distress.      Breath sounds: Normal breath sounds. No wheezing or rales.   Chest:      Chest wall: No tenderness.   Musculoskeletal:         General: Normal range of motion.   Skin:     General: Skin is warm and dry.   Neurological:      Mental Status: He is alert and oriented to person, place, and time.      Cranial Nerves: No cranial nerve deficit.      Sensory: No sensory deficit.      Motor: No abnormal muscle tone.      Coordination: Coordination normal.      Deep Tendon Reflexes: Reflexes normal.         Assessment/Plan   Diagnoses and all orders for this visit:    Cough (Primary)  Comments:  No improvement with prescribed medications steroids plus inhaler.  Not on any ACE inhibitor.  Will check chest x-ray and then reevaluate  Orders:  -     X-RAY CHEST 2 VIEWS; Future       Chest x-ray ordered.     By signing my name below, I, Makeda Turner, attest that this documentation has been prepared under the direction and in the presence of Nic Cochran MD      6/15/2022 4:09 PM

## 2022-06-16 ENCOUNTER — TELEPHONE (OUTPATIENT)
Dept: PRIMARY CARE | Facility: CLINIC | Age: 62
End: 2022-06-16
Payer: COMMERCIAL

## 2022-06-16 NOTE — TELEPHONE ENCOUNTER
Pt looking for follow up. Pt is currently at his allergist appt which they are requesting office notes from what was found by Dr. Jarquin. Please contact office.      [email or web address]  [phone]    Fax [phone]

## 2022-06-16 NOTE — TELEPHONE ENCOUNTER
Spoke to patient. He did view the cxr results on his MyChart. He is currently at the allergist office and will let Dr. Cochran know his recommendations.

## 2022-06-16 NOTE — TELEPHONE ENCOUNTER
Patient called to follow up would like to discuss course of action to treat persistent cough patient had chest x ray done on 6/15/2022 would like to discuss with pcp or nurse as soon as possible please contact and advise through patient portal or telephone.

## 2022-07-06 DIAGNOSIS — I10 ESSENTIAL HYPERTENSION: ICD-10-CM

## 2022-07-06 RX ORDER — LOSARTAN POTASSIUM 50 MG/1
50 TABLET ORAL DAILY
Qty: 90 TABLET | Refills: 3 | Status: SHIPPED | OUTPATIENT
Start: 2022-07-06 | End: 2023-07-14 | Stop reason: SDUPTHER

## 2022-07-06 NOTE — TELEPHONE ENCOUNTER
Pharmacy called in for pt's med refill      Medicine Refill Request    Last Office: 6/15/2022   Last Consult Visit: Visit date not found  Last Telemedicine Visit: Visit date not found    Next Appointment: Visit date not found      Current Outpatient Medications:   •  aspirin 81 mg enteric coated tablet, 81 mg., Disp: , Rfl:   •  cetirizine (ZYRTEC) 10 mg capsule, 10 mg., Disp: , Rfl:   •  cholecalciferol, vitamin D3, 25 mcg (1,000 unit) capsule, No SIG Entered, Disp: , Rfl:   •  fexofenadine (ALLEGRA) 180 mg tablet, Take 1 tablet (180 mg total) by mouth daily., Disp: 90 tablet, Rfl: 3  •  fluticasone propion-salmeteroL (ADVAIR DISKUS) 250-50 mcg/dose diskus inhaler, Inhale 1 puff 2 (two) times a day. (Patient not taking: Reported on 6/15/2022), Disp: 60 each, Rfl: 5  •  guaiFENesin (MUCINEX) 600 mg 12 hr tablet, Take 1,200 mg by mouth 2 (two) times a day., Disp: , Rfl:   •  ipratropium (ATROVENT) 0.03 % nasal spray, Administer 2 sprays into each nostril 2 (two) times a day., Disp: , Rfl: 3  •  lidocaine (LIDODERM) 5 % patch, Place 1 patch on the skin daily. Remove & discard patch within 12 hours or as directed by prescriber. (Patient not taking: Reported on 6/15/2022), Disp: 30 patch, Rfl: 1  •  lisinopriL (PRINIVIL) 10 mg tablet, Take 1 tablet (10 mg total) by mouth daily. (Patient not taking: No sig reported), Disp: 30 tablet, Rfl: 1  •  losartan (COZAAR) 50 mg tablet, Take 1 tablet (50 mg total) by mouth daily., Disp: 90 tablet, Rfl: 3  •  meloxicam (MOBIC) 7.5 mg tablet, Take 1 tablet (7.5 mg total) by mouth daily., Disp: 90 tablet, Rfl: 0  •  montelukast (SINGULAIR) 10 mg tablet, Take 10 mg by mouth once daily., Disp: , Rfl: 3  •  multivitamin (THERAGRAN) tablet, take 1 tablet by oral route  every day with food, Disp: , Rfl:       BP Readings from Last 3 Encounters:   06/15/22 120/80   06/06/22 120/80   10/06/21 120/80       Recent Lab results:  Lab Results   Component Value Date    CHOL 163 10/07/2021   ,    Lab Results   Component Value Date    HDL 58 10/07/2021   ,   Lab Results   Component Value Date    LDLCALC 100 10/07/2021   ,   Lab Results   Component Value Date    TRIG 25 (L) 10/07/2021        Lab Results   Component Value Date    GLUCOSE 109 (H) 10/07/2021   ,   Lab Results   Component Value Date    HGBA1C 5.3 06/03/2019         Lab Results   Component Value Date    CREATININE 1.1 10/07/2021       Lab Results   Component Value Date    TSH 1.72 06/03/2019

## 2022-08-11 NOTE — TELEPHONE ENCOUNTER
Pt and pharmacy calling to request a new script for meloxicam 15 mg to take 1 tablet  once a day. Script has to be written this way for insurance to pay for it.    Any questions please contact the pt.

## 2022-08-12 RX ORDER — MELOXICAM 15 MG/1
15 TABLET ORAL DAILY
Qty: 30 TABLET | Refills: 2 | Status: SHIPPED | OUTPATIENT
Start: 2022-08-12 | End: 2024-02-02 | Stop reason: ALTCHOICE

## 2022-09-22 ENCOUNTER — TELEPHONE (OUTPATIENT)
Dept: PRIMARY CARE | Facility: CLINIC | Age: 62
End: 2022-09-22
Payer: COMMERCIAL

## 2022-10-05 ENCOUNTER — TELEPHONE (OUTPATIENT)
Dept: PRIMARY CARE | Facility: CLINIC | Age: 62
End: 2022-10-05
Payer: COMMERCIAL

## 2022-10-05 DIAGNOSIS — I10 HYPERTENSION, UNSPECIFIED TYPE: ICD-10-CM

## 2022-10-05 DIAGNOSIS — Z12.5 SCREENING FOR PROSTATE CANCER: Primary | ICD-10-CM

## 2022-10-05 DIAGNOSIS — Z00.00 HEALTHCARE MAINTENANCE: ICD-10-CM

## 2022-10-05 NOTE — TELEPHONE ENCOUNTER
----- Message from Joseph Fletcher sent at 10/5/2022  2:24 PM EDT -----  Regarding: Physical  Hi Harper  Confirming appt 10/18 at 8:40 am  Thank you  Note: I would like blood work script sent in advance also (For the first time last year blood work did not include PSA results. So if that is no longer part of annual blood work I will need a second script for PSA test)  Thank you   Joseph Fletcher  [phone]

## 2022-10-05 NOTE — TELEPHONE ENCOUNTER
Pt is scheduled for 10/18 for physical    He would like labs mailed out before then     Also asked if PSA could be added, if it needs to be separate, please send separate script.    He stated it was not included in his lab work last year    THANK YOU

## 2022-10-15 ENCOUNTER — APPOINTMENT (OUTPATIENT)
Dept: LAB | Facility: HOSPITAL | Age: 62
End: 2022-10-15
Attending: INTERNAL MEDICINE
Payer: COMMERCIAL

## 2022-10-15 DIAGNOSIS — Z12.5 SCREENING FOR PROSTATE CANCER: ICD-10-CM

## 2022-10-15 DIAGNOSIS — I10 HYPERTENSION, UNSPECIFIED TYPE: ICD-10-CM

## 2022-10-15 DIAGNOSIS — Z00.00 HEALTHCARE MAINTENANCE: ICD-10-CM

## 2022-10-15 LAB
ALBUMIN SERPL-MCNC: 3.6 G/DL (ref 3.4–5)
ALP SERPL-CCNC: 42 IU/L (ref 35–126)
ALT SERPL-CCNC: 20 IU/L (ref 16–63)
ANION GAP SERPL CALC-SCNC: 7 MEQ/L (ref 3–15)
AST SERPL-CCNC: 21 IU/L (ref 15–41)
BACTERIA URNS QL MICRO: ABNORMAL /HPF
BASOPHILS # BLD: 0.02 K/UL (ref 0.01–0.1)
BASOPHILS NFR BLD: 0.3 %
BILIRUB SERPL-MCNC: 1 MG/DL (ref 0.3–1.2)
BILIRUB UR QL STRIP.AUTO: NEGATIVE MG/DL
BUN SERPL-MCNC: 23 MG/DL (ref 8–20)
CALCIUM SERPL-MCNC: 9.1 MG/DL (ref 8.9–10.3)
CHLORIDE SERPL-SCNC: 106 MEQ/L (ref 98–109)
CHOLEST SERPL-MCNC: 198 MG/DL
CLARITY UR REFRACT.AUTO: CLEAR
CO2 SERPL-SCNC: 25 MEQ/L (ref 22–32)
COLOR UR AUTO: YELLOW
CREAT SERPL-MCNC: 1.1 MG/DL (ref 0.8–1.3)
DIFFERENTIAL METHOD BLD: NORMAL
EOSINOPHIL # BLD: 0.1 K/UL (ref 0.04–0.54)
EOSINOPHIL NFR BLD: 1.6 %
ERYTHROCYTE [DISTWIDTH] IN BLOOD BY AUTOMATED COUNT: 12.3 % (ref 11.6–14.4)
GFR SERPL CREATININE-BSD FRML MDRD: >60 ML/MIN/1.73M*2
GLUCOSE SERPL-MCNC: 97 MG/DL (ref 70–99)
GLUCOSE UR STRIP.AUTO-MCNC: NEGATIVE MG/DL
HCT VFR BLDCO AUTO: 42.8 % (ref 40.1–51)
HDLC SERPL-MCNC: 70 MG/DL
HDLC SERPL: 2.8 {RATIO}
HGB BLD-MCNC: 14.8 G/DL (ref 13.7–17.5)
HGB UR QL STRIP.AUTO: 3
HYALINE CASTS #/AREA URNS LPF: ABNORMAL /LPF
IMM GRANULOCYTES # BLD AUTO: 0.03 K/UL (ref 0–0.08)
IMM GRANULOCYTES NFR BLD AUTO: 0.5 %
KETONES UR STRIP.AUTO-MCNC: NEGATIVE MG/DL
LDLC SERPL CALC-MCNC: 122 MG/DL
LEUKOCYTE ESTERASE UR QL STRIP.AUTO: NEGATIVE
LYMPHOCYTES # BLD: 1.52 K/UL (ref 1.2–3.5)
LYMPHOCYTES NFR BLD: 24.5 %
MCH RBC QN AUTO: 31.5 PG (ref 28–33.2)
MCHC RBC AUTO-ENTMCNC: 34.6 G/DL (ref 32.2–36.5)
MCV RBC AUTO: 91.1 FL (ref 83–98)
MONOCYTES # BLD: 0.47 K/UL (ref 0.3–1)
MONOCYTES NFR BLD: 7.6 %
MUCOUS THREADS URNS QL MICRO: ABNORMAL /LPF
NEUTROPHILS # BLD: 4.07 K/UL (ref 1.7–7)
NEUTS SEG NFR BLD: 65.5 %
NITRITE UR QL STRIP.AUTO: NEGATIVE
NONHDLC SERPL-MCNC: 128 MG/DL
NRBC BLD-RTO: 0 %
PDW BLD AUTO: 9.8 FL (ref 9.4–12.4)
PH UR STRIP.AUTO: 6 [PH]
PLATELET # BLD AUTO: 203 K/UL (ref 150–350)
POTASSIUM SERPL-SCNC: 4 MEQ/L (ref 3.6–5.1)
PROT SERPL-MCNC: 5.9 G/DL (ref 6–8.2)
PROT UR QL STRIP.AUTO: NEGATIVE
PSA SERPL-MCNC: 2.33 NG/ML
RBC # BLD AUTO: 4.7 M/UL (ref 4.5–5.8)
RBC #/AREA URNS HPF: ABNORMAL /HPF
SODIUM SERPL-SCNC: 138 MEQ/L (ref 136–144)
SP GR UR REFRACT.AUTO: 1.03
SQUAMOUS URNS QL MICRO: ABNORMAL /HPF
TRIGL SERPL-MCNC: 32 MG/DL (ref 30–149)
UROBILINOGEN UR STRIP-ACNC: 0.2 EU/DL
WBC # BLD AUTO: 6.21 K/UL (ref 3.8–10.5)
WBC #/AREA URNS HPF: ABNORMAL /HPF

## 2022-10-15 PROCEDURE — 36415 COLL VENOUS BLD VENIPUNCTURE: CPT

## 2022-10-15 PROCEDURE — 80053 COMPREHEN METABOLIC PANEL: CPT

## 2022-10-15 PROCEDURE — 81001 URINALYSIS AUTO W/SCOPE: CPT

## 2022-10-15 PROCEDURE — 80061 LIPID PANEL: CPT

## 2022-10-15 PROCEDURE — 84153 ASSAY OF PSA TOTAL: CPT

## 2022-10-15 PROCEDURE — 85025 COMPLETE CBC W/AUTO DIFF WBC: CPT

## 2022-10-17 ASSESSMENT — ENCOUNTER SYMPTOMS
CHILLS: 0
SORE THROAT: 0
DYSURIA: 0
ARTHRALGIAS: 0
PALPITATIONS: 0
BACK PAIN: 0
CHEST TIGHTNESS: 0
ABDOMINAL PAIN: 0
FATIGUE: 0
HEADACHES: 0
FREQUENCY: 0
COUGH: 0
WHEEZING: 0
BLOOD IN STOOL: 0
APPETITE CHANGE: 0
SHORTNESS OF BREATH: 0
CONSTIPATION: 0
ACTIVITY CHANGE: 0
DIAPHORESIS: 0
UNEXPECTED WEIGHT CHANGE: 0
BRUISES/BLEEDS EASILY: 0
NAUSEA: 0
DIZZINESS: 0
RHINORRHEA: 0
FEVER: 0
DIARRHEA: 0

## 2022-10-17 NOTE — PROGRESS NOTES
"       Subjective  Physical exam     Patient ID: Joseph Fletcher is a 62 y.o. male.  1960      Joseph Fletcher is a 61 y/o male who presents for a follow up. Patient reports that he does not usually get the flu shot. Patient reports concerns over increased PSA. Patient reports that he will get 5 or 6 mouth sores at a time. Patient reports that his knees bother him. Patient reports that he gets a cortisol shot in his right knee every 6 months. Patient reports playing tennis 2-3 times a week. Patient reports that he also bikes. Patient reports that he saw Dr. Wells for his allergies.       The following have been reviewed and updated as appropriate in this visit:        Review of Systems   Constitutional: Negative for activity change, appetite change, chills, diaphoresis, fatigue, fever and unexpected weight change.   HENT: Negative for congestion, ear pain, postnasal drip, rhinorrhea and sore throat.    Eyes: Negative for visual disturbance.   Respiratory: Negative for cough, chest tightness, shortness of breath and wheezing.    Cardiovascular: Negative for chest pain, palpitations and leg swelling.   Gastrointestinal: Negative for abdominal pain, blood in stool, constipation, diarrhea and nausea.   Genitourinary: Negative for dysuria, frequency and urgency.   Musculoskeletal: Negative for arthralgias and back pain.   Skin: Negative for rash.   Neurological: Negative for dizziness and headaches.   Hematological: Does not bruise/bleed easily.       Objective     Vitals:    10/18/22 0856 10/18/22 0908   BP: 126/80 120/80   BP Location:  Right upper arm   Patient Position:  Sitting   Pulse: (!) 51    Resp: 12    Temp: 36.6 °C (97.9 °F)    TempSrc: Oral    SpO2: 98%    Weight: 84.8 kg (187 lb)    Height: 1.702 m (5' 7\")      Body mass index is 29.29 kg/m².    Physical Exam  Constitutional:       Appearance: He is well-developed.   HENT:      Head: Normocephalic and atraumatic.      Nose: Nose normal.   Eyes:      " Conjunctiva/sclera: Conjunctivae normal.      Pupils: Pupils are equal, round, and reactive to light.   Cardiovascular:      Rate and Rhythm: Normal rate and regular rhythm.      Heart sounds: Normal heart sounds. No murmur heard.    No friction rub. No gallop.   Pulmonary:      Effort: Pulmonary effort is normal. No respiratory distress.      Breath sounds: Normal breath sounds. No wheezing or rales.   Chest:      Chest wall: No tenderness.   Musculoskeletal:         General: Normal range of motion.   Skin:     General: Skin is warm and dry.   Neurological:      Mental Status: He is alert and oriented to person, place, and time.      Cranial Nerves: No cranial nerve deficit.      Sensory: No sensory deficit.      Motor: No abnormal muscle tone.      Coordination: Coordination normal.      Deep Tendon Reflexes: Reflexes normal.         Assessment/Plan   Diagnoses and all orders for this visit:    Essential hypertension (Primary)  Comments:  Blood pressure okay    Elevated PSA  Comments:  Minimal bump but will refer to urology.  Orders:  -     Ambulatory referral to Urology; Future    Benign essential microscopic hematuria  Comments:  Chronic issue.  Has been seen by urology.  Is seeking another urologic opinion      Get bivalent COVID booster at Pharmacy. Follow up in 6 months.     By signing my name below, I, Makeda Turner, attest that this documentation has been prepared under the direction and in the presence of Nic Cochran MD      10/18/2022 10:28 AM

## 2022-10-18 ENCOUNTER — OFFICE VISIT (OUTPATIENT)
Dept: PRIMARY CARE | Facility: CLINIC | Age: 62
End: 2022-10-18
Payer: COMMERCIAL

## 2022-10-18 VITALS
HEART RATE: 51 BPM | RESPIRATION RATE: 12 BRPM | OXYGEN SATURATION: 98 % | DIASTOLIC BLOOD PRESSURE: 80 MMHG | SYSTOLIC BLOOD PRESSURE: 120 MMHG | BODY MASS INDEX: 29.35 KG/M2 | HEIGHT: 67 IN | WEIGHT: 187 LBS | TEMPERATURE: 97.9 F

## 2022-10-18 DIAGNOSIS — R97.20 ELEVATED PSA: ICD-10-CM

## 2022-10-18 DIAGNOSIS — I10 ESSENTIAL HYPERTENSION: Primary | ICD-10-CM

## 2022-10-18 DIAGNOSIS — R31.1 BENIGN ESSENTIAL MICROSCOPIC HEMATURIA: ICD-10-CM

## 2022-10-18 PROCEDURE — 99214 OFFICE O/P EST MOD 30 MIN: CPT | Performed by: INTERNAL MEDICINE

## 2022-10-18 PROCEDURE — 3079F DIAST BP 80-89 MM HG: CPT | Performed by: INTERNAL MEDICINE

## 2022-10-18 PROCEDURE — 3008F BODY MASS INDEX DOCD: CPT | Performed by: INTERNAL MEDICINE

## 2022-10-18 PROCEDURE — 3074F SYST BP LT 130 MM HG: CPT | Performed by: INTERNAL MEDICINE

## 2022-10-18 RX ORDER — VALACYCLOVIR HYDROCHLORIDE 500 MG/1
500 TABLET, FILM COATED ORAL
COMMUNITY
Start: 2022-10-01 | End: 2024-02-02 | Stop reason: ALTCHOICE

## 2023-05-02 ENCOUNTER — OFFICE VISIT (OUTPATIENT)
Dept: PRIMARY CARE | Facility: CLINIC | Age: 63
End: 2023-05-02
Payer: COMMERCIAL

## 2023-05-02 VITALS
HEART RATE: 55 BPM | OXYGEN SATURATION: 96 % | DIASTOLIC BLOOD PRESSURE: 80 MMHG | SYSTOLIC BLOOD PRESSURE: 118 MMHG | RESPIRATION RATE: 12 BRPM | HEIGHT: 70 IN | TEMPERATURE: 97.1 F | WEIGHT: 190 LBS | BODY MASS INDEX: 27.2 KG/M2

## 2023-05-02 DIAGNOSIS — R05.1 ACUTE COUGH: Primary | ICD-10-CM

## 2023-05-02 DIAGNOSIS — J06.9 VIRAL URI: ICD-10-CM

## 2023-05-02 DIAGNOSIS — I10 ESSENTIAL HYPERTENSION: ICD-10-CM

## 2023-05-02 PROCEDURE — 99213 OFFICE O/P EST LOW 20 MIN: CPT | Performed by: INTERNAL MEDICINE

## 2023-05-02 PROCEDURE — 3074F SYST BP LT 130 MM HG: CPT | Performed by: INTERNAL MEDICINE

## 2023-05-02 PROCEDURE — 3008F BODY MASS INDEX DOCD: CPT | Performed by: INTERNAL MEDICINE

## 2023-05-02 PROCEDURE — 3079F DIAST BP 80-89 MM HG: CPT | Performed by: INTERNAL MEDICINE

## 2023-05-02 RX ORDER — FLUTICASONE PROPIONATE AND SALMETEROL 250; 50 UG/1; UG/1
1 POWDER RESPIRATORY (INHALATION) 2 TIMES DAILY
Qty: 60 EACH | Refills: 5 | Status: SHIPPED | OUTPATIENT
Start: 2023-05-02 | End: 2024-02-02 | Stop reason: ALTCHOICE

## 2023-05-02 RX ORDER — BENZONATATE 100 MG/1
100 CAPSULE ORAL 3 TIMES DAILY PRN
Qty: 40 CAPSULE | Refills: 1 | Status: SHIPPED | OUTPATIENT
Start: 2023-05-02 | End: 2023-05-29

## 2023-05-02 RX ORDER — FLUTICASONE PROPIONATE 50 MCG
1 SPRAY, SUSPENSION (ML) NASAL DAILY
Qty: 16 G | Refills: 5 | Status: SHIPPED | OUTPATIENT
Start: 2023-05-02 | End: 2024-02-02 | Stop reason: ALTCHOICE

## 2023-05-02 ASSESSMENT — ENCOUNTER SYMPTOMS
DIARRHEA: 0
BACK PAIN: 0
ACTIVITY CHANGE: 0
SORE THROAT: 0
BRUISES/BLEEDS EASILY: 0
HEADACHES: 0
NAUSEA: 0
FREQUENCY: 0
CHILLS: 0
WHEEZING: 0
FATIGUE: 0
RHINORRHEA: 0
APPETITE CHANGE: 0
DIZZINESS: 0
ARTHRALGIAS: 0
COUGH: 1
SINUS PRESSURE: 1
UNEXPECTED WEIGHT CHANGE: 0
CONSTIPATION: 0
DIAPHORESIS: 0
SHORTNESS OF BREATH: 0
ABDOMINAL PAIN: 0
PALPITATIONS: 0
DYSURIA: 0
FEVER: 0
BLOOD IN STOOL: 0
CHEST TIGHTNESS: 0

## 2023-05-02 NOTE — PROGRESS NOTES
"      Subjective  Follow up     Patient ID: Joseph Fletcher is a 62 y.o. male.  1960      Joseph Fletcher is a 61 y/o male who presents for a follow up. Patient reports post-nasal drip, and other cold symptoms, which started Friday morning. Patient denies fever and facial/teeth pain. Patient reports that he tested negative for COVID.       The following have been reviewed and updated as appropriate in this visit:        Review of Systems   Constitutional: Negative for activity change, appetite change, chills, diaphoresis, fatigue, fever and unexpected weight change.   HENT: Positive for congestion, postnasal drip and sinus pressure. Negative for ear pain, rhinorrhea and sore throat.         Pt reports cold like symptoms, post-nasal drip, sinus pressure/congestion, a slight cough, and watery eyes   Eyes: Negative for visual disturbance.   Respiratory: Positive for cough. Negative for chest tightness, shortness of breath and wheezing.    Cardiovascular: Negative for chest pain, palpitations and leg swelling.   Gastrointestinal: Negative for abdominal pain, blood in stool, constipation, diarrhea and nausea.   Genitourinary: Negative for dysuria, frequency and urgency.   Musculoskeletal: Negative for arthralgias and back pain.   Skin: Negative for rash.   Neurological: Negative for dizziness and headaches.   Hematological: Does not bruise/bleed easily.       Objective     Vitals:    05/02/23 1557   BP: 118/80   Pulse: (!) 55   Resp: 12   Temp: 36.2 °C (97.1 °F)   TempSrc: Oral   SpO2: 96%   Weight: 86.2 kg (190 lb)   Height: 1.778 m (5' 10\")     Body mass index is 27.26 kg/m².    Physical Exam  Constitutional:       Appearance: He is well-developed.   HENT:      Head: Normocephalic and atraumatic.      Nose: Nose normal.   Eyes:      Conjunctiva/sclera: Conjunctivae normal.      Pupils: Pupils are equal, round, and reactive to light.   Cardiovascular:      Rate and Rhythm: Normal rate and regular rhythm.      Heart " sounds: Normal heart sounds. No murmur heard.     No friction rub. No gallop.   Pulmonary:      Effort: Pulmonary effort is normal. No respiratory distress.      Breath sounds: Normal breath sounds. No wheezing or rales.   Chest:      Chest wall: No tenderness.   Musculoskeletal:         General: Normal range of motion.   Skin:     General: Skin is warm and dry.   Neurological:      Mental Status: He is alert and oriented to person, place, and time.      Cranial Nerves: No cranial nerve deficit.      Sensory: No sensory deficit.      Motor: No abnormal muscle tone.      Coordination: Coordination normal.      Deep Tendon Reflexes: Reflexes normal.         Assessment/Plan   Diagnoses and all orders for this visit:    Acute cough (Primary)  Comments:  Suspect viral bronchitis.  Treat with Symbicort inhaler and nasal steroid.  Also Tessalon Perles for cough    Viral URI  Comments:  See above.  We will treat with a Symbicort inhaler and nasal steroid    Essential hypertension  Comments:  Blood pressure okay    Other orders  -     fluticasone propionate (FLONASE) 50 mcg/actuation nasal spray; Administer 1 spray into each nostril daily.  -     fluticasone propion-salmeteroL (ADVAIR DISKUS) 250-50 mcg/dose diskus inhaler; Inhale 1 puff 2 (two) times a day.  -     benzonatate (TESSALON PERLES) 100 mg capsule; Take 1 capsule (100 mg total) by mouth 3 (three) times a day as needed for cough for up to 27 days.          By signing my name below, I, Makeda Turner, attest that this documentation has been prepared under the direction and in the presence of Nic Cochran MD      5/2/2023 4:18 PM

## 2023-05-05 ENCOUNTER — TELEPHONE (OUTPATIENT)
Dept: PRIMARY CARE | Facility: CLINIC | Age: 63
End: 2023-05-05
Payer: COMMERCIAL

## 2023-05-05 NOTE — TELEPHONE ENCOUNTER
Spoke to patient. Has URI sx that started on 4/28. Tested negative for covid several times. Has persistent cough and intermittent fever. Saw PCP on 5/2 and was prescribed tessalon perles, advair, and flonase. He has been taking Nyquil, Mucinex DM, and tylenol. Advised him not to take Nyquil and Mucinex together as they may contain the same ingredients. Instructed him to read the labels. He has been out of work for 3 days and has to return tomorrow. Relayed he should not return to work until he is fever free without fever reducing medications. He verbalized understanding.

## 2023-05-12 ENCOUNTER — OFFICE VISIT (OUTPATIENT)
Dept: PRIMARY CARE | Facility: CLINIC | Age: 63
End: 2023-05-12
Payer: COMMERCIAL

## 2023-05-12 VITALS
TEMPERATURE: 97.3 F | HEART RATE: 67 BPM | WEIGHT: 183 LBS | SYSTOLIC BLOOD PRESSURE: 120 MMHG | RESPIRATION RATE: 16 BRPM | DIASTOLIC BLOOD PRESSURE: 70 MMHG | BODY MASS INDEX: 26.2 KG/M2 | OXYGEN SATURATION: 97 % | HEIGHT: 70 IN

## 2023-05-12 DIAGNOSIS — J30.1 NON-SEASONAL ALLERGIC RHINITIS DUE TO POLLEN: ICD-10-CM

## 2023-05-12 DIAGNOSIS — R05.1 ACUTE COUGH: ICD-10-CM

## 2023-05-12 DIAGNOSIS — I10 ESSENTIAL HYPERTENSION: Primary | ICD-10-CM

## 2023-05-12 PROCEDURE — 99214 OFFICE O/P EST MOD 30 MIN: CPT | Performed by: INTERNAL MEDICINE

## 2023-05-12 PROCEDURE — 3074F SYST BP LT 130 MM HG: CPT | Performed by: INTERNAL MEDICINE

## 2023-05-12 PROCEDURE — 3008F BODY MASS INDEX DOCD: CPT | Performed by: INTERNAL MEDICINE

## 2023-05-12 PROCEDURE — 3078F DIAST BP <80 MM HG: CPT | Performed by: INTERNAL MEDICINE

## 2023-05-12 RX ORDER — PREDNISONE 20 MG/1
40 TABLET ORAL DAILY
Qty: 10 TABLET | Refills: 0 | Status: SHIPPED | OUTPATIENT
Start: 2023-05-12 | End: 2024-02-02 | Stop reason: ALTCHOICE

## 2023-05-12 RX ORDER — MONTELUKAST SODIUM 10 MG/1
10 TABLET ORAL
Qty: 30 TABLET | Refills: 3 | Status: SHIPPED | OUTPATIENT
Start: 2023-05-12 | End: 2024-02-02 | Stop reason: ALTCHOICE

## 2023-05-12 ASSESSMENT — ENCOUNTER SYMPTOMS
ROS GI COMMENTS: RARE GERD SX'S
SHORTNESS OF BREATH: 1
HEADACHES: 0
RHINORRHEA: 1
MYALGIAS: 0
WHEEZING: 1
SWEATS: 1
SORE THROAT: 0
COUGH: 1
WEIGHT LOSS: 1
HEMOPTYSIS: 0
ADENOPATHY: 0
FEVER: 1
HEARTBURN: 0
UNEXPECTED WEIGHT CHANGE: 1

## 2023-05-12 NOTE — PROGRESS NOTES
Subjective      Patient ID: Joseph Fletcher is a 62 y.o. male.    He is here for follow-up and to address a few issues  1.  Persistent cough  2.  BP check  3.  Updated review of medication list  4.  History of allergic rhinitis    Cough  This is a new problem. The current episode started 1 to 4 weeks ago. The problem has been unchanged. The cough is productive of sputum. Associated symptoms include a fever (resolved), nasal congestion, postnasal drip, rhinorrhea, shortness of breath, sweats (resolved), weight loss (5 lbs over the past few weeks) and wheezing. Pertinent negatives include no chest pain, ear congestion, headaches, heartburn, hemoptysis, myalgias, rash or sore throat. Nothing aggravates the symptoms. He has tried steroid inhaler, a beta-agonist inhaler and prescription cough suppressant for the symptoms. His past medical history is significant for environmental allergies. There is no history of asthma or emphysema.       The following have been reviewed and updated as appropriate in this visit:        Review of Systems   Constitutional: Positive for fever (resolved), unexpected weight change and weight loss (5 lbs over the past few weeks).   HENT: Positive for postnasal drip and rhinorrhea. Negative for sore throat.    Respiratory: Positive for cough, shortness of breath and wheezing. Negative for hemoptysis.    Cardiovascular: Negative for chest pain.   Gastrointestinal: Negative for heartburn.        Rare GERD sx's   Musculoskeletal: Negative for myalgias.   Skin: Negative for rash.   Allergic/Immunologic: Positive for environmental allergies.   Neurological: Negative for headaches.   Hematological: Negative for adenopathy.   viable    Objective     Physical Exam  Vitals reviewed.   HENT:      Head: Normocephalic.   Eyes:      Extraocular Movements: Extraocular movements intact.      Pupils: Pupils are equal, round, and reactive to light.   Cardiovascular:      Rate and Rhythm: Normal rate.    Pulmonary:      Effort: Pulmonary effort is normal.      Breath sounds: Wheezing present.   Musculoskeletal:         General: Normal range of motion.      Right lower leg: No edema.      Left lower leg: No edema.   Neurological:      Mental Status: He is alert.         Assessment/Plan   Problem List Items Addressed This Visit        Respiratory    Cough     This is occurred in the past and appears to be recurrent coinciding with springtime allergies.  This is not responded to Tessalon Perles or a LABA/ICS inhaler although not sure that he has been getting the entire dosage of the latter due to coughing.  My plan is to resume the Sindelar that he previously was on and I am going to provide a 5-day course of prednisone.  Lung exam demonstrates significant bronchiolitis.  I have reordered a chest x-ray should his symptoms not be improved next week.  He verbalizes understanding of the plan.           Relevant Orders    X-RAY CHEST 2 VIEWS       Circulatory    Essential hypertension - Primary     BP is well controlled and I have confirmed that he is taking an ARB and no longer taking an ACE inhibitor            Ears/Nose/Throat    Allergic rhinitis     He follows regularly with allergy but has not been seen since his cough returned         Relevant Medications    predniSONE (DELTASONE) 20 mg tablet

## 2023-05-12 NOTE — ASSESSMENT & PLAN NOTE
This is occurred in the past and appears to be recurrent coinciding with springtime allergies.  This is not responded to Tessalon Perles or a LABA/ICS inhaler although not sure that he has been getting the entire dosage of the latter due to coughing.  My plan is to resume the Sindelar that he previously was on and I am going to provide a 5-day course of prednisone.  Lung exam demonstrates significant bronchiolitis.  I have reordered a chest x-ray should his symptoms not be improved next week.  He verbalizes understanding of the plan.

## 2023-05-12 NOTE — ASSESSMENT & PLAN NOTE
BP is well controlled and I have confirmed that he is taking an ARB and no longer taking an ACE inhibitor

## 2023-06-19 ENCOUNTER — DOCUMENTATION (OUTPATIENT)
Dept: PRIMARY CARE | Facility: CLINIC | Age: 63
End: 2023-06-19
Payer: COMMERCIAL

## 2023-07-14 DIAGNOSIS — I10 ESSENTIAL HYPERTENSION: ICD-10-CM

## 2023-07-14 RX ORDER — LOSARTAN POTASSIUM 50 MG/1
50 TABLET ORAL DAILY
Qty: 90 TABLET | Refills: 3 | Status: SHIPPED | OUTPATIENT
Start: 2023-07-14 | End: 2024-06-11 | Stop reason: SDUPTHER

## 2023-07-14 RX ORDER — MINERAL OIL
180 ENEMA (ML) RECTAL DAILY
Qty: 90 TABLET | Refills: 3 | Status: SHIPPED | OUTPATIENT
Start: 2023-07-14 | End: 2024-06-11 | Stop reason: SDUPTHER

## 2023-10-02 ENCOUNTER — TELEPHONE (OUTPATIENT)
Dept: PRIMARY CARE | Facility: CLINIC | Age: 63
End: 2023-10-02
Payer: COMMERCIAL

## 2023-10-02 NOTE — TELEPHONE ENCOUNTER
Pt stated that he wont be able to make his upcoming appt scheduling for 4:40pm on Oct 24th due to having to be at work. Pt stated that wants he wants to be accomadated for the same day but at 5:40pm Please call Pt if this is approved by Provider

## 2023-10-03 ENCOUNTER — TELEPHONE (OUTPATIENT)
Dept: PRIMARY CARE | Facility: CLINIC | Age: 63
End: 2023-10-03
Payer: COMMERCIAL

## 2023-10-03 DIAGNOSIS — E78.00 ELEVATED LDL CHOLESTEROL LEVEL: ICD-10-CM

## 2023-10-03 DIAGNOSIS — I10 ESSENTIAL HYPERTENSION: Primary | ICD-10-CM

## 2023-10-03 DIAGNOSIS — Z12.5 SCREENING FOR PROSTATE CANCER: ICD-10-CM

## 2023-10-03 DIAGNOSIS — Z00.00 HEALTHCARE MAINTENANCE: ICD-10-CM

## 2023-10-04 NOTE — TELEPHONE ENCOUNTER
Pt is requesting routine labs to be put in the system so he can get them done before his visit on 10/24/23 w/ Dr. Cochran

## 2023-10-23 ENCOUNTER — APPOINTMENT (OUTPATIENT)
Dept: LAB | Facility: HOSPITAL | Age: 63
End: 2023-10-23
Attending: INTERNAL MEDICINE
Payer: COMMERCIAL

## 2023-10-23 DIAGNOSIS — Z12.5 SCREENING FOR PROSTATE CANCER: ICD-10-CM

## 2023-10-23 DIAGNOSIS — I10 ESSENTIAL HYPERTENSION: ICD-10-CM

## 2023-10-23 DIAGNOSIS — Z00.00 HEALTHCARE MAINTENANCE: ICD-10-CM

## 2023-10-23 DIAGNOSIS — E78.00 ELEVATED LDL CHOLESTEROL LEVEL: ICD-10-CM

## 2023-10-23 LAB
ALBUMIN SERPL-MCNC: 4 G/DL (ref 3.5–5.7)
ALP SERPL-CCNC: 38 IU/L (ref 34–125)
ALT SERPL-CCNC: 24 IU/L (ref 7–52)
ANION GAP SERPL CALC-SCNC: 3 MEQ/L (ref 3–15)
AST SERPL-CCNC: 22 IU/L (ref 13–39)
BACTERIA URNS QL MICRO: ABNORMAL /HPF
BASOPHILS # BLD: 0.02 K/UL (ref 0.01–0.1)
BASOPHILS NFR BLD: 0.4 %
BILIRUB SERPL-MCNC: 0.6 MG/DL (ref 0.3–1.2)
BILIRUB UR QL STRIP.AUTO: NEGATIVE MG/DL
BUN SERPL-MCNC: 18 MG/DL (ref 7–25)
CALCIUM SERPL-MCNC: 9.3 MG/DL (ref 8.6–10.3)
CHLORIDE SERPL-SCNC: 107 MEQ/L (ref 98–107)
CHOLEST SERPL-MCNC: 182 MG/DL
CLARITY UR REFRACT.AUTO: CLEAR
CO2 SERPL-SCNC: 30 MEQ/L (ref 21–31)
COLOR UR AUTO: YELLOW
CREAT SERPL-MCNC: 1 MG/DL (ref 0.7–1.3)
DIFFERENTIAL METHOD BLD: NORMAL
EOSINOPHIL # BLD: 0.07 K/UL (ref 0.04–0.54)
EOSINOPHIL NFR BLD: 1.4 %
ERYTHROCYTE [DISTWIDTH] IN BLOOD BY AUTOMATED COUNT: 12.7 % (ref 11.6–14.4)
GFR SERPL CREATININE-BSD FRML MDRD: >60 ML/MIN/1.73M*2
GLUCOSE SERPL-MCNC: 101 MG/DL (ref 70–99)
GLUCOSE UR STRIP.AUTO-MCNC: NEGATIVE MG/DL
HCT VFR BLDCO AUTO: 44.8 % (ref 40.1–51)
HDLC SERPL-MCNC: 65 MG/DL
HDLC SERPL: 2.8 {RATIO}
HGB BLD-MCNC: 14.9 G/DL (ref 13.7–17.5)
HGB UR QL STRIP.AUTO: 2
HYALINE CASTS #/AREA URNS LPF: ABNORMAL /LPF
IMM GRANULOCYTES # BLD AUTO: 0.01 K/UL (ref 0–0.08)
IMM GRANULOCYTES NFR BLD AUTO: 0.2 %
KETONES UR STRIP.AUTO-MCNC: NEGATIVE MG/DL
LDLC SERPL CALC-MCNC: 109 MG/DL
LEUKOCYTE ESTERASE UR QL STRIP.AUTO: NEGATIVE
LYMPHOCYTES # BLD: 1.28 K/UL (ref 1.2–3.5)
LYMPHOCYTES NFR BLD: 24.9 %
MCH RBC QN AUTO: 30.9 PG (ref 28–33.2)
MCHC RBC AUTO-ENTMCNC: 33.3 G/DL (ref 32.2–36.5)
MCV RBC AUTO: 92.9 FL (ref 83–98)
MONOCYTES # BLD: 0.34 K/UL (ref 0.3–1)
MONOCYTES NFR BLD: 6.6 %
MUCOUS THREADS URNS QL MICRO: ABNORMAL /LPF
NEUTROPHILS # BLD: 3.43 K/UL (ref 1.7–7)
NEUTS SEG NFR BLD: 66.5 %
NITRITE UR QL STRIP.AUTO: NEGATIVE
NONHDLC SERPL-MCNC: 117 MG/DL
NRBC BLD-RTO: 0 %
PDW BLD AUTO: 9.7 FL (ref 9.4–12.4)
PH UR STRIP.AUTO: 6 [PH]
PLATELET # BLD AUTO: 239 K/UL (ref 150–350)
POTASSIUM SERPL-SCNC: 4.6 MEQ/L (ref 3.5–5.1)
PROT SERPL-MCNC: 6.5 G/DL (ref 6–8.2)
PROT UR QL STRIP.AUTO: NEGATIVE
PSA SERPL-MCNC: 2.45 NG/ML
RBC # BLD AUTO: 4.82 M/UL (ref 4.5–5.8)
RBC #/AREA URNS HPF: ABNORMAL /HPF
SODIUM SERPL-SCNC: 140 MEQ/L (ref 136–145)
SP GR UR REFRACT.AUTO: 1.02
SQUAMOUS URNS QL MICRO: ABNORMAL /HPF
TRIGL SERPL-MCNC: 42 MG/DL
UROBILINOGEN UR STRIP-ACNC: 0.2 EU/DL
WBC # BLD AUTO: 5.15 K/UL (ref 3.8–10.5)
WBC #/AREA URNS HPF: ABNORMAL /HPF

## 2023-10-23 PROCEDURE — 80061 LIPID PANEL: CPT

## 2023-10-23 PROCEDURE — 80053 COMPREHEN METABOLIC PANEL: CPT

## 2023-10-23 PROCEDURE — 36415 COLL VENOUS BLD VENIPUNCTURE: CPT

## 2023-10-23 PROCEDURE — 81001 URINALYSIS AUTO W/SCOPE: CPT

## 2023-10-23 PROCEDURE — 85025 COMPLETE CBC W/AUTO DIFF WBC: CPT

## 2023-10-23 PROCEDURE — 84153 ASSAY OF PSA TOTAL: CPT

## 2023-10-24 ENCOUNTER — OFFICE VISIT (OUTPATIENT)
Dept: PRIMARY CARE | Facility: CLINIC | Age: 63
End: 2023-10-24
Payer: COMMERCIAL

## 2023-10-24 VITALS
DIASTOLIC BLOOD PRESSURE: 80 MMHG | TEMPERATURE: 98 F | OXYGEN SATURATION: 98 % | HEART RATE: 56 BPM | RESPIRATION RATE: 16 BRPM | WEIGHT: 188 LBS | HEIGHT: 70 IN | SYSTOLIC BLOOD PRESSURE: 120 MMHG | BODY MASS INDEX: 26.92 KG/M2

## 2023-10-24 DIAGNOSIS — Z23 NEED FOR VACCINATION: ICD-10-CM

## 2023-10-24 DIAGNOSIS — I10 ESSENTIAL HYPERTENSION: ICD-10-CM

## 2023-10-24 DIAGNOSIS — Z00.00 PHYSICAL EXAM: Primary | ICD-10-CM

## 2023-10-24 PROCEDURE — 3079F DIAST BP 80-89 MM HG: CPT | Performed by: INTERNAL MEDICINE

## 2023-10-24 PROCEDURE — 3074F SYST BP LT 130 MM HG: CPT | Performed by: INTERNAL MEDICINE

## 2023-10-24 PROCEDURE — 99396 PREV VISIT EST AGE 40-64: CPT | Mod: 25 | Performed by: INTERNAL MEDICINE

## 2023-10-24 PROCEDURE — 90471 IMMUNIZATION ADMIN: CPT | Performed by: INTERNAL MEDICINE

## 2023-10-24 PROCEDURE — 3008F BODY MASS INDEX DOCD: CPT | Performed by: INTERNAL MEDICINE

## 2023-10-24 PROCEDURE — 90686 IIV4 VACC NO PRSV 0.5 ML IM: CPT | Performed by: INTERNAL MEDICINE

## 2023-10-24 ASSESSMENT — ENCOUNTER SYMPTOMS
SORE THROAT: 0
CHEST TIGHTNESS: 0
BACK PAIN: 0
BRUISES/BLEEDS EASILY: 0
COUGH: 0
CONSTIPATION: 0
FREQUENCY: 0
ABDOMINAL PAIN: 0
SHORTNESS OF BREATH: 0
DIAPHORESIS: 0
CHILLS: 0
DYSURIA: 0
ACTIVITY CHANGE: 0
WHEEZING: 0
FEVER: 0
ARTHRALGIAS: 0
HEADACHES: 0
APPETITE CHANGE: 0
RHINORRHEA: 0
PALPITATIONS: 0
NAUSEA: 0
DIZZINESS: 0
FATIGUE: 0
DIARRHEA: 0
BLOOD IN STOOL: 0
UNEXPECTED WEIGHT CHANGE: 0

## 2023-10-24 NOTE — PROGRESS NOTES
"      Subjective  Physical exam     Patient ID: Joseph Fletcher is a 63 y.o. male.  1960      Joseph Fletcher is a 64 y/o male who presents for a physical exam. Patient reports that he doesn't want to get the flu or COVID shot cause he gets sick every year regardless. Patient reports being up to date with colonoscopy.       The following have been reviewed and updated as appropriate in this visit:        Review of Systems   Constitutional: Negative for activity change, appetite change, chills, diaphoresis, fatigue, fever and unexpected weight change.   HENT: Negative for congestion, ear pain, postnasal drip, rhinorrhea and sore throat.    Eyes: Negative for visual disturbance.   Respiratory: Negative for cough, chest tightness, shortness of breath and wheezing.    Cardiovascular: Negative for chest pain, palpitations and leg swelling.   Gastrointestinal: Negative for abdominal pain, blood in stool, constipation, diarrhea and nausea.   Genitourinary: Negative for dysuria, frequency and urgency.   Musculoskeletal: Negative for arthralgias and back pain.   Skin: Negative for rash.   Neurological: Negative for dizziness and headaches.   Hematological: Does not bruise/bleed easily.       Objective     Vitals:    10/24/23 1809 10/24/23 1833   BP: 114/72 120/80   BP Location: Left upper arm Right upper arm   Patient Position: Sitting Sitting   Pulse: (!) 56    Resp: 16    Temp: 36.7 °C (98 °F)    TempSrc: Oral    SpO2: 98%    Weight: 85.3 kg (188 lb)    Height: 1.778 m (5' 10\")      Body mass index is 26.98 kg/m².    Physical Exam  Constitutional:       Appearance: He is well-developed.   HENT:      Head: Normocephalic and atraumatic.      Nose: Nose normal.   Eyes:      Conjunctiva/sclera: Conjunctivae normal.      Pupils: Pupils are equal, round, and reactive to light.   Cardiovascular:      Rate and Rhythm: Normal rate and regular rhythm.      Heart sounds: Normal heart sounds. No murmur heard.     No friction rub. No " gallop.   Pulmonary:      Effort: Pulmonary effort is normal. No respiratory distress.      Breath sounds: Normal breath sounds. No wheezing or rales.   Chest:      Chest wall: No tenderness.   Musculoskeletal:         General: Normal range of motion.   Skin:     General: Skin is warm and dry.   Neurological:      Mental Status: He is alert and oriented to person, place, and time.      Cranial Nerves: No cranial nerve deficit.      Sensory: No sensory deficit.      Motor: No abnormal muscle tone.      Coordination: Coordination normal.      Deep Tendon Reflexes: Reflexes normal.         Assessment/Plan   Diagnoses and all orders for this visit:    Physical exam (Primary)  Comments:  Exam normal.  Labs okay.  We will give him flu shot today and schedule other vaccines including Shingrix Tdap    Need for vaccination  Comments:  Flu shot will be given today  Orders:  -     Influenza vaccine quadrivalent preservative free 6 mon and older IM (FluLaval)    Essential hypertension  Comments:  Blood pressure at goal         Follow up in 1 year, sooner if needed. Follow up on diagnostics for shingles and tdap.     By signing my name below, I, Makeda Turner, attest that this documentation has been prepared under the direction and in the presence of Nic Cochran MD      10/24/2023 6:44 PM

## 2023-12-12 NOTE — TELEPHONE ENCOUNTER
"Chief Complaint  Nasal Congestion, Headache, and sinus pressure    Subjective      Shiva Varela is a 40 year old male that presents to [facility] with c/o sinus congestion, drainage and headache. Symptoms started on 11/30 and are still present. He denies cough, fever, body aches or chills. He has been taking zyrtec which has been helpful. He has not been taking his flonase.      History of Present Illness    Current Outpatient Medications   Medication Instructions    amoxicillin-clavulanate (AUGMENTIN) 875-125 MG per tablet 1 tablet, Oral, 2 Times Daily    Chlorcyclizine-Pseudoephed 25-60 MG tablet 1 tablet, Oral, 3 Times Daily PRN    ergocalciferol (ERGOCALCIFEROL) 1.25 MG (72883 UT) capsule vitamin d 08392 unit caps    fluticasone (FLONASE) 50 MCG/ACT nasal spray SPRAY 1 SPRAY INTO EACH NOSTRIL EVERY DAY    lisdexamfetamine (VYVANSE) 50 mg, Oral, Every Morning    metoprolol succinate XL (TOPROL-XL) 100 MG 24 hr tablet TAKE 1 TABLET DAILY    omeprazole (priLOSEC) 20 MG capsule TAKE 1 CAPSULE DAILY    primidone (MYSOLINE) 50 MG tablet Titrate as directed and increase every 3 days up to 5 tablets twice a day.    sertraline (ZOLOFT) 100 MG tablet TAKE 1 TABLET DAILY    terazosin (HYTRIN) 2 mg, Oral, Nightly       The following portions of the patient's history were reviewed and updated as appropriate: allergies, current medications, past family history, past medical history, past social history, past surgical history, and problem list.    Objective   Vital Signs:   /88 (BP Location: Left arm)   Pulse 91   Temp 97.6 °F (36.4 °C) (Temporal)   Ht 195.6 cm (77\")   Wt 122 kg (269 lb 12.8 oz)   SpO2 96%   BMI 31.99 kg/m²     Wt Readings from Last 3 Encounters:   12/12/23 122 kg (269 lb 12.8 oz)   12/04/23 122 kg (268 lb 3.2 oz)   11/14/23 119 kg (262 lb)     BP Readings from Last 3 Encounters:   12/12/23 128/88   12/04/23 124/76   11/14/23 126/82     Physical " Test Order Request   Patient PCP: Nic Cochran MD  Next Office Visit: Visit date not found  Preferred Lab: [facility]  [address] [place] [id number]  Tests Requested: General Lab Work  , MyChart, or US Mail?: Mychart    The practice will reach out to discuss your request within 2 business days.   Exam  Constitutional:       General: He is not in acute distress.     Appearance: Normal appearance. He is not ill-appearing.   HENT:      Right Ear: Tympanic membrane, ear canal and external ear normal.      Left Ear: Tympanic membrane, ear canal and external ear normal.      Nose: Nose normal.      Right Sinus: Frontal sinus tenderness present.      Left Sinus: Frontal sinus tenderness present.      Mouth/Throat:      Lips: Pink.      Mouth: Mucous membranes are moist.      Pharynx: Uvula midline. Posterior oropharyngeal erythema present. No oropharyngeal exudate.      Comments: Post nasal drainage  Cardiovascular:      Rate and Rhythm: Normal rate and regular rhythm.      Heart sounds: Normal heart sounds.   Pulmonary:      Effort: Pulmonary effort is normal.      Breath sounds: Normal breath sounds.   Neurological:      Mental Status: He is alert.   Psychiatric:         Mood and Affect: Mood normal.         Behavior: Behavior normal.          Result Review :  The following data was reviewed by: SHAAN Kimble on 12/12/2023:          Lab Results (last 72 hours)       ** No results found for the last 72 hours. **             No Images in the past 120 days found..    Lab Results   Component Value Date    BILIRUBINUR Negative 12/04/2023       Procedures        Assessment and Plan   Diagnoses and all orders for this visit:    1. Acute frontal sinusitis, recurrence not specified (Primary)  -     Chlorcyclizine-Pseudoephed 25-60 MG tablet; Take 1 tablet by mouth 3 (Three) Times a Day As Needed (congestion).  Dispense: 21 tablet; Refill: 0  -     amoxicillin-clavulanate (AUGMENTIN) 875-125 MG per tablet; Take 1 tablet by mouth 2 (Two) Times a Day for 7 days.  Dispense: 14 tablet; Refill: 0                  There are no discontinued medications.       Follow Up   No follow-ups on file.  Patient was given instructions and counseling regarding his condition or for health maintenance advice. Please see specific  information pulled into the AVS if appropriate.     Supportive care with rest, plenty of fluids, tylenol/ibuprofen for pain and/or fever as needed per label instructions.  Resume your flonase. You may find a cool-mist humidifier helpful as well as throat lozenges, Chloraseptic spray and warm salt water gargles. Should you develop shortness of breath, chest pain or worsening of symptoms please go to the ER.    SHAAN Kimble  12/12/23  11:40 EST

## 2024-01-16 ENCOUNTER — OFFICE VISIT (OUTPATIENT)
Dept: PRIMARY CARE | Facility: CLINIC | Age: 64
End: 2024-01-16
Payer: COMMERCIAL

## 2024-01-16 VITALS
BODY MASS INDEX: 28.2 KG/M2 | HEIGHT: 70 IN | TEMPERATURE: 98.2 F | OXYGEN SATURATION: 98 % | WEIGHT: 197 LBS | DIASTOLIC BLOOD PRESSURE: 80 MMHG | HEART RATE: 58 BPM | SYSTOLIC BLOOD PRESSURE: 120 MMHG

## 2024-01-16 DIAGNOSIS — I10 ESSENTIAL HYPERTENSION: Primary | ICD-10-CM

## 2024-01-16 DIAGNOSIS — I70.90 ATHEROSCLEROSIS: ICD-10-CM

## 2024-01-16 DIAGNOSIS — R21 RASH: ICD-10-CM

## 2024-01-16 PROCEDURE — 99214 OFFICE O/P EST MOD 30 MIN: CPT | Performed by: INTERNAL MEDICINE

## 2024-01-16 PROCEDURE — 3074F SYST BP LT 130 MM HG: CPT | Performed by: INTERNAL MEDICINE

## 2024-01-16 PROCEDURE — 3079F DIAST BP 80-89 MM HG: CPT | Performed by: INTERNAL MEDICINE

## 2024-01-16 PROCEDURE — 3008F BODY MASS INDEX DOCD: CPT | Performed by: INTERNAL MEDICINE

## 2024-01-16 ASSESSMENT — ENCOUNTER SYMPTOMS
COUGH: 0
PALPITATIONS: 0
BACK PAIN: 0
CHILLS: 0
APPETITE CHANGE: 0
DIZZINESS: 0
UNEXPECTED WEIGHT CHANGE: 0
FEVER: 0
ABDOMINAL PAIN: 0
RHINORRHEA: 0
DIARRHEA: 0
SHORTNESS OF BREATH: 0
WHEEZING: 0
FATIGUE: 0
ACTIVITY CHANGE: 0
DYSURIA: 0
DIAPHORESIS: 0
HEADACHES: 0
NAUSEA: 0
CHEST TIGHTNESS: 0
BLOOD IN STOOL: 0
ARTHRALGIAS: 0
BRUISES/BLEEDS EASILY: 0
FREQUENCY: 0
SORE THROAT: 0
CONSTIPATION: 0

## 2024-01-16 ASSESSMENT — PATIENT HEALTH QUESTIONNAIRE - PHQ9: SUM OF ALL RESPONSES TO PHQ9 QUESTIONS 1 & 2: 0

## 2024-01-16 NOTE — PROGRESS NOTES
"      Subjective  Follow up     Patient ID: Joseph Fletcher is a 63 y.o. male.  1960      Joseph Fletcher is a 62 y/o male who presents for a follow up. Patient reports that his right knee is bone on bone, so he got cortisol shots. Patient reports he gets a rash that feels like sunburn and is very itchy. Patient reports it is difficult to sleep with the rash, and the rash develops into hives.      The following have been reviewed and updated as appropriate in this visit:        Review of Systems   Constitutional: Negative for activity change, appetite change, chills, diaphoresis, fatigue, fever and unexpected weight change.   HENT: Negative for congestion, ear pain, postnasal drip, rhinorrhea and sore throat.    Eyes: Negative for visual disturbance.   Respiratory: Negative for cough, chest tightness, shortness of breath and wheezing.    Cardiovascular: Negative for chest pain, palpitations and leg swelling.   Gastrointestinal: Negative for abdominal pain, blood in stool, constipation, diarrhea and nausea.   Genitourinary: Negative for dysuria, frequency and urgency.   Musculoskeletal: Negative for arthralgias and back pain.   Skin: Positive for rash.   Neurological: Negative for dizziness and headaches.   Hematological: Does not bruise/bleed easily.       Objective     Vitals:    01/16/24 1742 01/16/24 1757   BP: 138/80 120/80   BP Location: Left upper arm Right upper arm   Patient Position: Sitting Sitting   Pulse: (!) 58    Temp: 36.8 °C (98.2 °F)    TempSrc: Oral    SpO2: 98%    Weight: 89.4 kg (197 lb)    Height: 1.778 m (5' 10\")      Body mass index is 28.27 kg/m².    Physical Exam  Constitutional:       Appearance: He is well-developed.   HENT:      Head: Normocephalic and atraumatic.      Nose: Nose normal.   Eyes:      Conjunctiva/sclera: Conjunctivae normal.      Pupils: Pupils are equal, round, and reactive to light.   Cardiovascular:      Rate and Rhythm: Normal rate and regular rhythm.      Heart " sounds: Normal heart sounds. No murmur heard.     No friction rub. No gallop.   Pulmonary:      Effort: Pulmonary effort is normal. No respiratory distress.      Breath sounds: Normal breath sounds. No wheezing or rales.   Chest:      Chest wall: No tenderness.   Musculoskeletal:         General: Normal range of motion.   Skin:     General: Skin is warm and dry.   Neurological:      Mental Status: He is alert and oriented to person, place, and time.      Cranial Nerves: No cranial nerve deficit.      Sensory: No sensory deficit.      Motor: No abnormal muscle tone.      Coordination: Coordination normal.      Deep Tendon Reflexes: Reflexes normal.         Assessment/Plan   Diagnoses and all orders for this visit:    Essential hypertension (Primary)  Comments:  Blood pressure okay    Atherosclerosis  Comments:  Lower extremity calcification picked up with the imaging.  Will get a coronary calcium score to assess atherosclerotic burden  Orders:  -     CT HEART CORONARY ARTERY CALCIUM SCORE WITHOUT IV CONTRAST; Future    Rash  Comments:  This seems like an allergic process refer him to allergist  Orders:  -     Ambulatory referral to Allergy; Future      Follow up in a couple weeks.     By signing my name below, I, Makeda Turner, attest that this documentation has been prepared under the direction and in the presence of Nic Cochran MD      1/16/2024 6:55 PM

## 2024-01-25 ENCOUNTER — HOSPITAL ENCOUNTER (OUTPATIENT)
Dept: RADIOLOGY | Age: 64
Discharge: HOME | End: 2024-01-25
Attending: INTERNAL MEDICINE

## 2024-01-25 DIAGNOSIS — I70.90 ATHEROSCLEROSIS: ICD-10-CM

## 2024-01-25 PROCEDURE — 75571 CT HRT W/O DYE W/CA TEST: CPT | Mod: MG

## 2024-01-30 ENCOUNTER — OFFICE VISIT (OUTPATIENT)
Dept: PRIMARY CARE | Facility: CLINIC | Age: 64
End: 2024-01-30
Payer: COMMERCIAL

## 2024-01-30 VITALS
WEIGHT: 194 LBS | DIASTOLIC BLOOD PRESSURE: 78 MMHG | RESPIRATION RATE: 14 BRPM | TEMPERATURE: 97.9 F | HEART RATE: 59 BPM | OXYGEN SATURATION: 98 % | BODY MASS INDEX: 27.77 KG/M2 | SYSTOLIC BLOOD PRESSURE: 128 MMHG | HEIGHT: 70 IN

## 2024-01-30 DIAGNOSIS — I10 ESSENTIAL HYPERTENSION: Primary | ICD-10-CM

## 2024-01-30 DIAGNOSIS — I25.10 ASCVD (ARTERIOSCLEROTIC CARDIOVASCULAR DISEASE): ICD-10-CM

## 2024-01-30 DIAGNOSIS — I70.90 ATHEROSCLEROSIS: ICD-10-CM

## 2024-01-30 PROCEDURE — 3008F BODY MASS INDEX DOCD: CPT | Performed by: INTERNAL MEDICINE

## 2024-01-30 PROCEDURE — 3078F DIAST BP <80 MM HG: CPT | Performed by: INTERNAL MEDICINE

## 2024-01-30 PROCEDURE — 3074F SYST BP LT 130 MM HG: CPT | Performed by: INTERNAL MEDICINE

## 2024-01-30 PROCEDURE — 99214 OFFICE O/P EST MOD 30 MIN: CPT | Performed by: INTERNAL MEDICINE

## 2024-01-30 RX ORDER — ROSUVASTATIN CALCIUM 40 MG/1
40 TABLET, COATED ORAL DAILY
Qty: 90 TABLET | Refills: 3 | Status: SHIPPED | OUTPATIENT
Start: 2024-01-30 | End: 2024-06-11 | Stop reason: SDUPTHER

## 2024-01-30 RX ORDER — ASPIRIN 81 MG/1
81 TABLET ORAL DAILY
Qty: 90 TABLET | Refills: 3 | Status: SHIPPED | OUTPATIENT
Start: 2024-01-30 | End: 2024-06-11 | Stop reason: SDUPTHER

## 2024-01-30 RX ORDER — ROSUVASTATIN CALCIUM 40 MG/1
40 TABLET, COATED ORAL DAILY
Qty: 90 TABLET | Refills: 1 | Status: SHIPPED | OUTPATIENT
Start: 2024-01-30 | End: 2024-01-30 | Stop reason: ALTCHOICE

## 2024-01-30 ASSESSMENT — ENCOUNTER SYMPTOMS
DYSURIA: 0
FEVER: 0
FREQUENCY: 0
ACTIVITY CHANGE: 0
ARTHRALGIAS: 0
PALPITATIONS: 0
DIZZINESS: 0
RHINORRHEA: 0
HEADACHES: 0
DIARRHEA: 0
ABDOMINAL PAIN: 0
FATIGUE: 0
NAUSEA: 0
BLOOD IN STOOL: 0
SHORTNESS OF BREATH: 0
CONSTIPATION: 0
CHEST TIGHTNESS: 0
DIAPHORESIS: 0
COUGH: 0
BACK PAIN: 0
BRUISES/BLEEDS EASILY: 0
APPETITE CHANGE: 0
SORE THROAT: 0
UNEXPECTED WEIGHT CHANGE: 0
CHILLS: 0
WHEEZING: 0

## 2024-01-30 NOTE — PROGRESS NOTES
"      Subjective  Follow up     Patient ID: Joseph Fletcher is a 63 y.o. male.  1960      Joseph Fletcher is a 62 y/o male who presents for a follow up. Patient denies chest pain or pressure. Patient reports that he feels short of breath more often than he has in the past. Patient reports that he bikes, lifts weights, and plays tennis when he can.       The following have been reviewed and updated as appropriate in this visit:        Review of Systems   Constitutional: Negative for activity change, appetite change, chills, diaphoresis, fatigue, fever and unexpected weight change.   HENT: Negative for congestion, ear pain, postnasal drip, rhinorrhea and sore throat.    Eyes: Negative for visual disturbance.   Respiratory: Negative for cough, chest tightness, shortness of breath and wheezing.    Cardiovascular: Negative for chest pain, palpitations and leg swelling.   Gastrointestinal: Negative for abdominal pain, blood in stool, constipation, diarrhea and nausea.   Genitourinary: Negative for dysuria, frequency and urgency.   Musculoskeletal: Negative for arthralgias and back pain.   Skin: Negative for rash.   Neurological: Negative for dizziness and headaches.   Hematological: Does not bruise/bleed easily.       Objective     Vitals:    01/30/24 1814   BP: 128/78   BP Location: Left upper arm   Patient Position: Sitting   Pulse: (!) 59   Resp: 14   Temp: 36.6 °C (97.9 °F)   TempSrc: Oral   SpO2: 98%   Weight: 88 kg (194 lb)   Height: 1.778 m (5' 10\")     Body mass index is 27.84 kg/m².    Physical Exam  Constitutional:       Appearance: He is well-developed.   HENT:      Head: Normocephalic and atraumatic.      Nose: Nose normal.   Eyes:      Conjunctiva/sclera: Conjunctivae normal.      Pupils: Pupils are equal, round, and reactive to light.   Cardiovascular:      Rate and Rhythm: Normal rate and regular rhythm.      Heart sounds: Normal heart sounds. No murmur heard.     No friction rub. No gallop.   Pulmonary: "      Effort: Pulmonary effort is normal. No respiratory distress.      Breath sounds: Normal breath sounds. No wheezing or rales.   Chest:      Chest wall: No tenderness.   Musculoskeletal:         General: Normal range of motion.   Skin:     General: Skin is warm and dry.   Neurological:      Mental Status: He is alert and oriented to person, place, and time.      Cranial Nerves: No cranial nerve deficit.      Sensory: No sensory deficit.      Motor: No abnormal muscle tone.      Coordination: Coordination normal.      Deep Tendon Reflexes: Reflexes normal.         Assessment/Plan   Diagnoses and all orders for this visit:    Essential hypertension (Primary)  Comments:  Blood pressure okay    ASCVD (arteriosclerotic cardiovascular disease)  Comments:  Clearly has ASCVD.  Start rosuvastatin 40 mg a day +1 baby aspirin a day plus refer to cardiology    Atherosclerosis  Comments:  Start statin  Orders:  -     Ambulatory referral to Cardiology; Future  -     Lipid panel; Future  -     Comprehensive metabolic panel; Future  -     CK, Total; Future  -     ECG 12 LEAD OFFICE PERFORMED    Other orders  -     aspirin 81 mg enteric coated tablet; Take 1 tablet (81 mg total) by mouth daily.  -     rosuvastatin (CRESTOR) 40 mg tablet; Take 1 tablet (40 mg total) by mouth daily.             By signing my name below, I, Makeda Turner, attest that this documentation has been prepared under the direction and in the presence of Nic Cochran MD      1/30/2024 6:56 PM

## 2024-02-02 ENCOUNTER — TELEPHONE (OUTPATIENT)
Dept: SCHEDULING | Facility: CLINIC | Age: 64
End: 2024-02-02
Payer: COMMERCIAL

## 2024-02-02 ENCOUNTER — OFFICE VISIT (OUTPATIENT)
Dept: CARDIOLOGY | Facility: CLINIC | Age: 64
End: 2024-02-02
Payer: COMMERCIAL

## 2024-02-02 VITALS — HEART RATE: 51 BPM | BODY MASS INDEX: 27.06 KG/M2 | HEIGHT: 70 IN | OXYGEN SATURATION: 98 % | WEIGHT: 189 LBS

## 2024-02-02 DIAGNOSIS — I10 ESSENTIAL HYPERTENSION: Primary | ICD-10-CM

## 2024-02-02 DIAGNOSIS — I25.10 CORONARY ARTERY DISEASE INVOLVING NATIVE CORONARY ARTERY OF NATIVE HEART WITHOUT ANGINA PECTORIS: ICD-10-CM

## 2024-02-02 DIAGNOSIS — R00.2 PALPITATIONS: ICD-10-CM

## 2024-02-02 DIAGNOSIS — R06.09 DOE (DYSPNEA ON EXERTION): ICD-10-CM

## 2024-02-02 DIAGNOSIS — E78.5 DYSLIPIDEMIA: ICD-10-CM

## 2024-02-02 DIAGNOSIS — R73.01 ELEVATED FASTING GLUCOSE: ICD-10-CM

## 2024-02-02 PROCEDURE — 93000 ELECTROCARDIOGRAM COMPLETE: CPT | Performed by: INTERNAL MEDICINE

## 2024-02-02 PROCEDURE — 3008F BODY MASS INDEX DOCD: CPT | Performed by: INTERNAL MEDICINE

## 2024-02-02 PROCEDURE — 99204 OFFICE O/P NEW MOD 45 MIN: CPT | Performed by: INTERNAL MEDICINE

## 2024-02-02 ASSESSMENT — ENCOUNTER SYMPTOMS
NERVOUS/ANXIOUS: 0
DYSPNEA ON EXERTION: 1
NAUSEA: 0
COUGH: 0
DIZZINESS: 0
WEIGHT GAIN: 0
SNORING: 0
HEARTBURN: 0
BACK PAIN: 0
DIAPHORESIS: 0
SHORTNESS OF BREATH: 0
CLAUDICATION: 0
DEPRESSION: 0
PALPITATIONS: 1

## 2024-02-02 NOTE — PROGRESS NOTES
Cardiology Consult Note    Joseph Fletcher is a 63 y.o. male who presents for evaluation of an elevated coronary calcium score and calcification of infrapopliteal vessels noted on plain xrays  He lifts weights every day or every other day.  He plays tennis once or twice weekly -singles or doubles. He will bike for 10-15 miles 1-2 times per week.   He may note some SHERMAN with climbing one flight of steps or rushing with his walking at work. This has been present for the last few months.   He may note a rare palpitations when lying quietly in bed.  This has been present for several years.   Patient  denies chest distress, orthopnea, PND, edema,  syncope or near syncope.     Pertinent radiology results reviewed..       Past Medical History:   Diagnosis Date   • Hypertension           Past Surgical History:   Procedure Laterality Date   • KNEE SURGERY         Social History     Tobacco Use   • Smoking status: Never   • Smokeless tobacco: Never   Vaping Use   • Vaping Use: Never used   Substance Use Topics   • Alcohol use: Yes     Alcohol/week: 1.0 standard drink of alcohol     Types: 1 Cans of beer per week     Comment: socially   • Drug use: No          Family History   Problem Relation Age of Onset   • Breast cancer Biological Mother    • Multiple sclerosis Biological Mother    • Heart disease Biological Father    • Prostate cancer Biological Father    • Arrhythmia Biological Brother        Allergies     No known allergies, No known drug allergies, and Pollen extracts    Current Outpatient Medications   Medication Sig Dispense Refill   • aspirin 81 mg enteric coated tablet Take 1 tablet (81 mg total) by mouth daily. 90 tablet 3   • rosuvastatin (CRESTOR) 40 mg tablet Take 1 tablet (40 mg total) by mouth daily. 90 tablet 3   • cholecalciferol, vitamin D3, 25 mcg (1,000 unit) capsule Take 1,000 Units by mouth daily.     • fexofenadine (ALLEGRA) 180 mg tablet Take 1 tablet (180 mg total) by mouth daily. (Patient taking  differently: Take 360 mg by mouth daily.) 90 tablet 3   • losartan (COZAAR) 50 mg tablet Take 1 tablet (50 mg total) by mouth daily. 90 tablet 3     No current facility-administered medications for this visit.         Review of Systems   Constitutional: Negative for diaphoresis and weight gain.   Eyes: Negative for visual disturbance.   Cardiovascular: Positive for dyspnea on exertion and palpitations. Negative for chest pain, claudication and leg swelling.   Respiratory: Negative for cough, shortness of breath and snoring.    Skin: Positive for itching. Negative for rash.   Musculoskeletal: Negative for arthritis and back pain.   Gastrointestinal: Negative for heartburn and nausea.   Genitourinary: Negative for nocturia.   Neurological: Negative for dizziness.   Psychiatric/Behavioral: Negative for depression. The patient is not nervous/anxious.        Objective       Vitals:    02/02/24 1355   Pulse: (!) 51   SpO2: 98%       Physical Exam  Constitutional:       Appearance: He is well-developed.   HENT:      Head: Normocephalic.   Eyes:      General:         Right eye: No discharge.         Left eye: No discharge.      Funduscopic exam:     Right eye: No AV nicking or arteriolar narrowing.         Left eye: No AV nicking or arteriolar narrowing.   Neck:      Vascular: No JVD.   Cardiovascular:      Rate and Rhythm: Normal rate and regular rhythm.      Heart sounds: Normal heart sounds.      Comments: Split S1- normal variant   Pulmonary:      Breath sounds: Normal breath sounds.   Abdominal:      General: Bowel sounds are normal.      Palpations: Abdomen is soft.   Musculoskeletal:         General: No deformity.   Skin:     Findings: No rash.   Neurological:      Mental Status: He is alert and oriented to person, place, and time.   Psychiatric:         Behavior: Behavior normal.          Wt Readings from Last 3 Encounters:   02/02/24 85.7 kg (189 lb)   01/30/24 88 kg (194 lb)   01/16/24 89.4 kg (197 lb)          Labs   Lab Results   Component Value Date    WBC 5.15 10/23/2023    HGB 14.9 10/23/2023    HCT 44.8 10/23/2023     10/23/2023    CHOL 182 10/23/2023    TRIG 42 10/23/2023    HDL 65 10/23/2023    ALT 24 10/23/2023    AST 22 10/23/2023     10/23/2023    K 4.6 10/23/2023     10/23/2023    CREATININE 1.0 10/23/2023    BUN 18 10/23/2023    CO2 30 10/23/2023    TSH 1.72 06/03/2019       Imaging  1/26/24 CT of chest - coronary calcium score reviewed     ECG   Sinus bradycardia, normal tracing       Assessment:  This is a 63 y.o. male being consulted for SHERMAN and elevated coronary calcium score     Problem List Items Addressed This Visit        High    Coronary artery disease involving native coronary artery of native heart without angina pectoris     Subclinical CAD with coronary calcium score of 1152 on 1/24 study placing him in the 95th percentile.    He was started on aspirin 81 mg daily and rosuvastatin 40 mg daily on 1/30 by Dr. Cochran    See SHERMAN          Relevant Orders    Echocardiogram stress test       Medium    Essential hypertension - Primary     BP well controlled  Continue present meds  Low salt diet  Will monitor BP and notify MD if elevated           Relevant Orders    ECG 12 LEAD-OFFICE PERFORMED (Completed)    SHERMAN (dyspnea on exertion)     Ischemic eval advised  Options explored in detail  Exercise stress echo to be done          Relevant Orders    Echocardiogram stress test    Dyslipidemia     10/23/23  , TG 42, HDL 65, -down from 122  Started on rosuvastatin - I reviewed this med with him  For repeat labs in a couple months.             Low    Elevated fasting glucose     Fasting blood glucose of 101 on 10/23 labs  A1c with next labs         Relevant Orders    Hemoglobin A1c    Palpitations     Infrequent   I  discussed options of outpatient telemetry of variable duration based on frequency of episodes.             He will return later in the year.   I spent 63  minutes on this date of service performing the following activities: obtaining history, performing examination, entering orders, documenting, preparing for visit, obtaining / reviewing records, providing counseling and education, independently reviewing study/studies and communicating results.      Luis Alberto Maya MD  2/2/2024

## 2024-02-02 NOTE — ASSESSMENT & PLAN NOTE
Subclinical CAD with coronary calcium score of 1152 on 1/24 study placing him in the 95th percentile.    He was started on aspirin 81 mg daily and rosuvastatin 40 mg daily on 1/30 by Dr. Dimas SHERMAN

## 2024-02-02 NOTE — LETTER
February 2, 2024     Nic Cochran MD  [address]  [place]  [facility] [address]    Patient: Joseph Fletcher  YOB: 1960  Date of Visit: 2/2/2024      Dear Dr. Cochran:    Thank you for referring Joseph Fletcher to me for evaluation. Below are my notes for this consultation.    If you have questions, please do not hesitate to call me. I look forward to following your patient along with you.         Sincerely,        Luis Alberto Maya MD        CC: No Recipients    Luis Alberto Maya MD  2/2/2024  3:20 PM  Signed  Cardiology Consult Note    Joseph Fletcher is a 63 y.o. male who presents for evaluation of an elevated coronary calcium score and calcification of infrapopliteal vessels noted on plain xrays  He lifts weights every day or every other day.  He plays tennis once or twice weekly -singles or doubles. He will bike for 10-15 miles 1-2 times per week.   He may note some SHERMAN with climbing one flight of steps or rushing with his walking at work. This has been present for the last few months.   He may note a rare palpitations when lying quietly in bed.  This has been present for several years.   Patient  denies chest distress, orthopnea, PND, edema,  syncope or near syncope.     Pertinent radiology results reviewed..       Past Medical History:   Diagnosis Date   • Hypertension           Past Surgical History:   Procedure Laterality Date   • KNEE SURGERY         Social History     Tobacco Use   • Smoking status: Never   • Smokeless tobacco: Never   Vaping Use   • Vaping Use: Never used   Substance Use Topics   • Alcohol use: Yes     Alcohol/week: 1.0 standard drink of alcohol     Types: 1 Cans of beer per week     Comment: socially   • Drug use: No          Family History   Problem Relation Age of Onset   • Breast cancer Biological Mother    • Multiple sclerosis Biological Mother    • Heart disease Biological Father    • Prostate cancer Biological Father    • Arrhythmia Biological Brother         Allergies     No known allergies, No known drug allergies, and Pollen extracts    Current Outpatient Medications   Medication Sig Dispense Refill   • aspirin 81 mg enteric coated tablet Take 1 tablet (81 mg total) by mouth daily. 90 tablet 3   • rosuvastatin (CRESTOR) 40 mg tablet Take 1 tablet (40 mg total) by mouth daily. 90 tablet 3   • cholecalciferol, vitamin D3, 25 mcg (1,000 unit) capsule Take 1,000 Units by mouth daily.     • fexofenadine (ALLEGRA) 180 mg tablet Take 1 tablet (180 mg total) by mouth daily. (Patient taking differently: Take 360 mg by mouth daily.) 90 tablet 3   • losartan (COZAAR) 50 mg tablet Take 1 tablet (50 mg total) by mouth daily. 90 tablet 3     No current facility-administered medications for this visit.         Review of Systems   Constitutional: Negative for diaphoresis and weight gain.   Eyes: Negative for visual disturbance.   Cardiovascular: Positive for dyspnea on exertion and palpitations. Negative for chest pain, claudication and leg swelling.   Respiratory: Negative for cough, shortness of breath and snoring.    Skin: Positive for itching. Negative for rash.   Musculoskeletal: Negative for arthritis and back pain.   Gastrointestinal: Negative for heartburn and nausea.   Genitourinary: Negative for nocturia.   Neurological: Negative for dizziness.   Psychiatric/Behavioral: Negative for depression. The patient is not nervous/anxious.        Objective       Vitals:    02/02/24 1355   Pulse: (!) 51   SpO2: 98%       Physical Exam  Constitutional:       Appearance: He is well-developed.   HENT:      Head: Normocephalic.   Eyes:      General:         Right eye: No discharge.         Left eye: No discharge.      Funduscopic exam:     Right eye: No AV nicking or arteriolar narrowing.         Left eye: No AV nicking or arteriolar narrowing.   Neck:      Vascular: No JVD.   Cardiovascular:      Rate and Rhythm: Normal rate and regular rhythm.      Heart sounds: Normal heart  sounds.      Comments: Split S1- normal variant   Pulmonary:      Breath sounds: Normal breath sounds.   Abdominal:      General: Bowel sounds are normal.      Palpations: Abdomen is soft.   Musculoskeletal:         General: No deformity.   Skin:     Findings: No rash.   Neurological:      Mental Status: He is alert and oriented to person, place, and time.   Psychiatric:         Behavior: Behavior normal.          Wt Readings from Last 3 Encounters:   02/02/24 85.7 kg (189 lb)   01/30/24 88 kg (194 lb)   01/16/24 89.4 kg (197 lb)         Labs   Lab Results   Component Value Date    WBC 5.15 10/23/2023    HGB 14.9 10/23/2023    HCT 44.8 10/23/2023     10/23/2023    CHOL 182 10/23/2023    TRIG 42 10/23/2023    HDL 65 10/23/2023    ALT 24 10/23/2023    AST 22 10/23/2023     10/23/2023    K 4.6 10/23/2023     10/23/2023    CREATININE 1.0 10/23/2023    BUN 18 10/23/2023    CO2 30 10/23/2023    TSH 1.72 06/03/2019       Imaging  1/26/24 CT of chest - coronary calcium score reviewed     ECG   Sinus bradycardia, normal tracing       Assessment:  This is a 63 y.o. male being consulted for SHERMAN and elevated coronary calcium score     Problem List Items Addressed This Visit        High    Coronary artery disease involving native coronary artery of native heart without angina pectoris     Subclinical CAD with coronary calcium score of 1152 on 1/24 study placing him in the 95th percentile.    He was started on aspirin 81 mg daily and rosuvastatin 40 mg daily on 1/30 by Dr. Cochran    See SHERMAN          Relevant Orders    Echocardiogram stress test       Medium    Essential hypertension - Primary     BP well controlled  Continue present meds  Low salt diet  Will monitor BP and notify MD if elevated           Relevant Orders    ECG 12 LEAD-OFFICE PERFORMED (Completed)    SHERMAN (dyspnea on exertion)     Ischemic eval advised  Options explored in detail  Exercise stress echo to be done          Relevant Orders     Echocardiogram stress test    Dyslipidemia     10/23/23  , TG 42, HDL 65, -down from 122  Started on rosuvastatin - I reviewed this med with him  For repeat labs in a couple months.             Low    Elevated fasting glucose     Fasting blood glucose of 101 on 10/23 labs  A1c with next labs         Relevant Orders    Hemoglobin A1c    Palpitations     Infrequent   I  discussed options of outpatient telemetry of variable duration based on frequency of episodes.             He will return later in the year.   I spent 63 minutes on this date of service performing the following activities: obtaining history, performing examination, entering orders, documenting, preparing for visit, obtaining / reviewing records, providing counseling and education, independently reviewing study/studies and communicating results.      Luis Alberto Maya MD  2/2/2024

## 2024-02-02 NOTE — LETTER
February 2, 2024     Nic Cochran MD  [address]  [place], [address]  [facility] [address]    Patient: Joseph Fletcher  YOB: 1960  Date of Visit: 2/2/2024      Dear Dr. Cochran:    Thank you for referring Joseph Fletcher to me for evaluation. Below are my notes for this consultation.    If you have questions, please do not hesitate to call me. I look forward to following your patient along with you.         Sincerely,        Luis Alberto Maya MD        CC: No Recipients    Luis Alberto Maya MD  2/2/2024  3:20 PM  Signed  Cardiology Consult Note    Joseph Fletcher is a 63 y.o. male who presents for evaluation of an elevated coronary calcium score and calcification of infrapopliteal vessels noted on plain xrays  He lifts weights every day or every other day.  He plays tennis once or twice weekly -singles or doubles. He will bike for 10-15 miles 1-2 times per week.   He may note some SHERMAN with climbing one flight of steps or rushing with his walking at work. This has been present for the last few months.   He may note a rare palpitations when lying quietly in bed.  This has been present for several years.   Patient  denies chest distress, orthopnea, PND, edema,  syncope or near syncope.     Pertinent radiology results reviewed..       Past Medical History:   Diagnosis Date   • Hypertension           Past Surgical History:   Procedure Laterality Date   • KNEE SURGERY         Social History     Tobacco Use   • Smoking status: Never   • Smokeless tobacco: Never   Vaping Use   • Vaping Use: Never used   Substance Use Topics   • Alcohol use: Yes     Alcohol/week: 1.0 standard drink of alcohol     Types: 1 Cans of beer per week     Comment: socially   • Drug use: No          Family History   Problem Relation Age of Onset   • Breast cancer Biological Mother    • Multiple sclerosis Biological Mother    • Heart disease Biological Father    • Prostate cancer Biological Father    • Arrhythmia Biological Brother         Allergies     No known allergies, No known drug allergies, and Pollen extracts    Current Outpatient Medications   Medication Sig Dispense Refill   • aspirin 81 mg enteric coated tablet Take 1 tablet (81 mg total) by mouth daily. 90 tablet 3   • rosuvastatin (CRESTOR) 40 mg tablet Take 1 tablet (40 mg total) by mouth daily. 90 tablet 3   • cholecalciferol, vitamin D3, 25 mcg (1,000 unit) capsule Take 1,000 Units by mouth daily.     • fexofenadine (ALLEGRA) 180 mg tablet Take 1 tablet (180 mg total) by mouth daily. (Patient taking differently: Take 360 mg by mouth daily.) 90 tablet 3   • losartan (COZAAR) 50 mg tablet Take 1 tablet (50 mg total) by mouth daily. 90 tablet 3     No current facility-administered medications for this visit.         Review of Systems   Constitutional: Negative for diaphoresis and weight gain.   Eyes: Negative for visual disturbance.   Cardiovascular: Positive for dyspnea on exertion and palpitations. Negative for chest pain, claudication and leg swelling.   Respiratory: Negative for cough, shortness of breath and snoring.    Skin: Positive for itching. Negative for rash.   Musculoskeletal: Negative for arthritis and back pain.   Gastrointestinal: Negative for heartburn and nausea.   Genitourinary: Negative for nocturia.   Neurological: Negative for dizziness.   Psychiatric/Behavioral: Negative for depression. The patient is not nervous/anxious.        Objective       Vitals:    02/02/24 1355   Pulse: (!) 51   SpO2: 98%       Physical Exam  Constitutional:       Appearance: He is well-developed.   HENT:      Head: Normocephalic.   Eyes:      General:         Right eye: No discharge.         Left eye: No discharge.      Funduscopic exam:     Right eye: No AV nicking or arteriolar narrowing.         Left eye: No AV nicking or arteriolar narrowing.   Neck:      Vascular: No JVD.   Cardiovascular:      Rate and Rhythm: Normal rate and regular rhythm.      Heart sounds: Normal heart  sounds.      Comments: Split S1- normal variant   Pulmonary:      Breath sounds: Normal breath sounds.   Abdominal:      General: Bowel sounds are normal.      Palpations: Abdomen is soft.   Musculoskeletal:         General: No deformity.   Skin:     Findings: No rash.   Neurological:      Mental Status: He is alert and oriented to person, place, and time.   Psychiatric:         Behavior: Behavior normal.          Wt Readings from Last 3 Encounters:   02/02/24 85.7 kg (189 lb)   01/30/24 88 kg (194 lb)   01/16/24 89.4 kg (197 lb)         Labs   Lab Results   Component Value Date    WBC 5.15 10/23/2023    HGB 14.9 10/23/2023    HCT 44.8 10/23/2023     10/23/2023    CHOL 182 10/23/2023    TRIG 42 10/23/2023    HDL 65 10/23/2023    ALT 24 10/23/2023    AST 22 10/23/2023     10/23/2023    K 4.6 10/23/2023     10/23/2023    CREATININE 1.0 10/23/2023    BUN 18 10/23/2023    CO2 30 10/23/2023    TSH 1.72 06/03/2019       Imaging  1/26/24 CT of chest - coronary calcium score reviewed     ECG   Sinus bradycardia, normal tracing       Assessment:  This is a 63 y.o. male being consulted for SHERMAN and elevated coronary calcium score     Problem List Items Addressed This Visit        High    Coronary artery disease involving native coronary artery of native heart without angina pectoris     Subclinical CAD with coronary calcium score of 1152 on 1/24 study placing him in the 95th percentile.    He was started on aspirin 81 mg daily and rosuvastatin 40 mg daily on 1/30 by Dr. Cochran    See SHERMAN          Relevant Orders    Echocardiogram stress test       Medium    Essential hypertension - Primary     BP well controlled  Continue present meds  Low salt diet  Will monitor BP and notify MD if elevated           Relevant Orders    ECG 12 LEAD-OFFICE PERFORMED (Completed)    SHERMAN (dyspnea on exertion)     Ischemic eval advised  Options explored in detail  Exercise stress echo to be done          Relevant Orders     Echocardiogram stress test    Dyslipidemia     10/23/23  , TG 42, HDL 65, -down from 122  Started on rosuvastatin - I reviewed this med with him  For repeat labs in a couple months.             Low    Elevated fasting glucose     Fasting blood glucose of 101 on 10/23 labs  A1c with next labs         Relevant Orders    Hemoglobin A1c    Palpitations     Infrequent   I  discussed options of outpatient telemetry of variable duration based on frequency of episodes.             He will return later in the year.   I spent 63 minutes on this date of service performing the following activities: obtaining history, performing examination, entering orders, documenting, preparing for visit, obtaining / reviewing records, providing counseling and education, independently reviewing study/studies and communicating results.      Luis Alberto Maya MD  2/2/2024

## 2024-02-02 NOTE — ASSESSMENT & PLAN NOTE
BP well controlled  Continue present meds  Low salt diet  Will monitor BP and notify MD if elevated

## 2024-02-02 NOTE — ASSESSMENT & PLAN NOTE
Infrequent   I  discussed options of outpatient telemetry of variable duration based on frequency of episodes.

## 2024-02-02 NOTE — TELEPHONE ENCOUNTER
Pt wanted to update his family history pt sister was born with Bicuspid aortic heart valve was replaced with an artificial tricuspid heart  valve   [phone]

## 2024-02-02 NOTE — ASSESSMENT & PLAN NOTE
10/23/23  , TG 42, HDL 65, -down from 122  Started on rosuvastatin - I reviewed this med with him  For repeat labs in a couple months.

## 2024-02-05 ENCOUNTER — TELEPHONE (OUTPATIENT)
Dept: SCHEDULING | Facility: CLINIC | Age: 64
End: 2024-02-05
Payer: COMMERCIAL

## 2024-02-05 NOTE — TELEPHONE ENCOUNTER
Primary Insur Plan Pre-cert:   [place] - SE:   [id number]  2/5/24 -- 5/4/24      Secondary Insur plan precert   SE - [id number]   2/5/24 -- 3/6/24    Please call pt to assist with scheduling test Thanks,

## 2024-02-05 NOTE — TELEPHONE ENCOUNTER
Pt called to francis Echo stress test at [facility].      Please precert through insurance and call him at [phone] so he can schedule. ty

## 2024-02-05 NOTE — TELEPHONE ENCOUNTER
I spoke with patient to let him know this was added to his family history and this will be evaluated for him on his stress echo.  He is aware to call to schedule the study.

## 2024-02-12 ENCOUNTER — TELEPHONE (OUTPATIENT)
Dept: CARDIOLOGY | Facility: HOSPITAL | Age: 64
End: 2024-02-12
Payer: COMMERCIAL

## 2024-02-14 ENCOUNTER — HOSPITAL ENCOUNTER (OUTPATIENT)
Dept: CARDIOLOGY | Facility: HOSPITAL | Age: 64
Discharge: HOME | End: 2024-02-14
Attending: INTERNAL MEDICINE
Payer: COMMERCIAL

## 2024-02-14 VITALS
WEIGHT: 189 LBS | OXYGEN SATURATION: 99 % | DIASTOLIC BLOOD PRESSURE: 78 MMHG | HEART RATE: 68 BPM | SYSTOLIC BLOOD PRESSURE: 140 MMHG | RESPIRATION RATE: 16 BRPM | HEIGHT: 70 IN | BODY MASS INDEX: 27.06 KG/M2

## 2024-02-14 DIAGNOSIS — R06.09 DOE (DYSPNEA ON EXERTION): ICD-10-CM

## 2024-02-14 DIAGNOSIS — I25.10 CORONARY ARTERY DISEASE INVOLVING NATIVE CORONARY ARTERY OF NATIVE HEART WITHOUT ANGINA PECTORIS: ICD-10-CM

## 2024-02-14 LAB
ASCENDING AORTA: 3.7 CM
BSA FOR ECHO PROCEDURE: 2.06 M2
E WAVE DECELERATION TIME: 194 MS
E/A RATIO: 1.1
E/E' RATIO: 10.4
E/LAT E' RATIO: 9.7
EDV (BP): 159 CM3
EF (A4C): 63.7 %
EF A2C: 58.9 %
EJECTION FRACTION: 61.7 %
ESV (BP): 60.9 CM3
LA ESV (BP): 63 CM3
LA ESV INDEX (A2C): 39.32 CM3/M2
LA ESV INDEX (BP): 30.58 CM3/M2
LAAS-AP2: 29.6 CM2
LAAS-AP4: 23.1 CM2
LALD A4C: 5.98 CM
LALD A4C: 6.11 CM
LAV-S: 81 CM3
LEFT ATRIUM VOLUME INDEX: 22.33 CM3/M2
LEFT ATRIUM VOLUME: 46 CM3
LEFT VENTRICLE DIASTOLIC VOLUME INDEX: 80.58 CM3/M2
LEFT VENTRICLE DIASTOLIC VOLUME: 166 CM3
LEFT VENTRICLE SYSTOLIC VOLUME INDEX: 29.22 CM3/M2
LEFT VENTRICLE SYSTOLIC VOLUME: 60.2 CM3
LV DIASTOLIC VOLUME: 146 CM3
LV ESV (APICAL 2 CHAMBER): 59.9 CM3
LVAD-AP2: 38.2 CM2
LVAD-AP4: 41.5 CM2
LVAS-AP2: 21.7 CM2
LVAS-AP4: 22.5 CM2
LVEDVI(A2C): 70.87 CM3/M2
LVEDVI(BP): 77.18 CM3/M2
LVESVI(A2C): 29.08 CM3/M2
LVESVI(BP): 29.56 CM3/M2
LVLD-AP2: 8.23 CM
LVLD-AP4: 8.53 CM
LVLS-AP2: 6.79 CM
LVLS-AP4: 7.09 CM
LVOT MG: 2 MMHG
LVOT MV: 0.68 M/S
LVOT PEAK VELOCITY: 1.02 M/S
LVOT PG: 4 MMHG
LVOT VTI: 22.8 CM
MV E'TISSUE VEL-LAT: 0.08 M/S
MV E'TISSUE VEL-MED: 0.07 M/S
MV PEAK A VEL: 0.66 M/S
MV PEAK E VEL: 0.75 M/S
PULM VEIN S/D RATIO: 1.1
PV PEAK D VEL: 0.6 M/S
PV PEAK S VEL: 0.64 M/S
STRESS BASELINE BP: NORMAL MMHG
STRESS BASELINE HR: 68 BPM
STRESS O2 SAT REST: 99 %
STRESS PERCENT HR: 88 %
STRESS POST ESTIMATED WORKLOAD: 10.5 METS
STRESS POST EXERCISE DUR MIN: 9 MIN
STRESS POST EXERCISE DUR SEC: 11 SEC
STRESS POST PEAK BP: NORMAL MMHG
STRESS POST PEAK HR: 137 BPM
STRESS TARGET HR: 133 BPM

## 2024-02-14 PROCEDURE — 25500000 HC DRUGS/INCIDENT RAD: Mod: JZ | Performed by: INTERNAL MEDICINE

## 2024-02-14 PROCEDURE — 93325 DOPPLER ECHO COLOR FLOW MAPG: CPT | Mod: 26 | Performed by: INTERNAL MEDICINE

## 2024-02-14 PROCEDURE — 93320 DOPPLER ECHO COMPLETE: CPT | Mod: 26 | Performed by: INTERNAL MEDICINE

## 2024-02-14 PROCEDURE — C8930 TTE W OR W/O CONTR, CONT ECG: HCPCS

## 2024-02-14 PROCEDURE — 93018 CV STRESS TEST I&R ONLY: CPT | Performed by: INTERNAL MEDICINE

## 2024-02-14 PROCEDURE — 93350 STRESS TTE ONLY: CPT | Mod: 26 | Performed by: INTERNAL MEDICINE

## 2024-02-14 PROCEDURE — 93320 DOPPLER ECHO COMPLETE: CPT

## 2024-02-14 PROCEDURE — 93016 CV STRESS TEST SUPVJ ONLY: CPT | Performed by: INTERNAL MEDICINE

## 2024-02-14 PROCEDURE — 25000000 HC PHARMACY GENERAL: Performed by: INTERNAL MEDICINE

## 2024-02-14 RX ADMIN — PERFLUTREN: 6.52 INJECTION, SUSPENSION INTRAVENOUS at 09:19

## 2024-02-15 ENCOUNTER — TELEPHONE (OUTPATIENT)
Dept: CARDIOLOGY | Facility: CLINIC | Age: 64
End: 2024-02-15
Payer: COMMERCIAL

## 2024-02-15 NOTE — TELEPHONE ENCOUNTER
I spoke with patient.  Stress echo was negative for ischemia on both ECG and echo images.  He will need to be set up for a follow-up visit with Dr. Maya.

## 2024-03-08 ENCOUNTER — APPOINTMENT (OUTPATIENT)
Dept: LAB | Facility: HOSPITAL | Age: 64
End: 2024-03-08
Attending: INTERNAL MEDICINE
Payer: COMMERCIAL

## 2024-03-08 ENCOUNTER — TELEPHONE (OUTPATIENT)
Dept: CARDIOLOGY | Facility: CLINIC | Age: 64
End: 2024-03-08
Payer: COMMERCIAL

## 2024-03-08 DIAGNOSIS — I70.90 ATHEROSCLEROSIS: ICD-10-CM

## 2024-03-08 DIAGNOSIS — R73.01 ELEVATED FASTING GLUCOSE: ICD-10-CM

## 2024-03-08 LAB
ALBUMIN SERPL-MCNC: 4 G/DL (ref 3.5–5.7)
ALP SERPL-CCNC: 43 IU/L (ref 34–125)
ALT SERPL-CCNC: 25 IU/L (ref 7–52)
ANION GAP SERPL CALC-SCNC: 5 MEQ/L (ref 3–15)
AST SERPL-CCNC: 23 IU/L (ref 13–39)
BILIRUB SERPL-MCNC: 0.8 MG/DL (ref 0.3–1.2)
BUN SERPL-MCNC: 22 MG/DL (ref 7–25)
CALCIUM SERPL-MCNC: 9.3 MG/DL (ref 8.6–10.3)
CHLORIDE SERPL-SCNC: 105 MEQ/L (ref 98–107)
CHOLEST SERPL-MCNC: 123 MG/DL
CK SERPL-CCNC: 114 U/L (ref 30–223)
CO2 SERPL-SCNC: 31 MEQ/L (ref 21–31)
CREAT SERPL-MCNC: 1.1 MG/DL (ref 0.7–1.3)
EGFRCR SERPLBLD CKD-EPI 2021: >60 ML/MIN/1.73M*2
EST. AVERAGE GLUCOSE BLD GHB EST-MCNC: 97 MG/DL
GLUCOSE SERPL-MCNC: 100 MG/DL (ref 70–99)
HBA1C MFR BLD: 5 %
HDLC SERPL-MCNC: 59 MG/DL
HDLC SERPL: 2.1 {RATIO}
LDLC SERPL CALC-MCNC: 57 MG/DL
NONHDLC SERPL-MCNC: 64 MG/DL
POTASSIUM SERPL-SCNC: 4.5 MEQ/L (ref 3.5–5.1)
PROT SERPL-MCNC: 6.6 G/DL (ref 6–8.2)
SODIUM SERPL-SCNC: 141 MEQ/L (ref 136–145)
TRIGL SERPL-MCNC: 35 MG/DL

## 2024-03-08 PROCEDURE — 36415 COLL VENOUS BLD VENIPUNCTURE: CPT

## 2024-03-08 PROCEDURE — 83036 HEMOGLOBIN GLYCOSYLATED A1C: CPT

## 2024-03-08 PROCEDURE — 80053 COMPREHEN METABOLIC PANEL: CPT

## 2024-03-08 PROCEDURE — 80061 LIPID PANEL: CPT

## 2024-03-08 PROCEDURE — 82550 ASSAY OF CK (CPK): CPT

## 2024-03-08 NOTE — TELEPHONE ENCOUNTER
I called and left voice message improvement of LDL from 10 9-57 and normal hemoglobin A1c of 5.0.  Aware to call back with any questions.

## 2024-03-19 ENCOUNTER — OFFICE VISIT (OUTPATIENT)
Dept: PRIMARY CARE | Facility: CLINIC | Age: 64
End: 2024-03-19
Payer: COMMERCIAL

## 2024-03-19 VITALS
WEIGHT: 196 LBS | SYSTOLIC BLOOD PRESSURE: 120 MMHG | OXYGEN SATURATION: 98 % | HEART RATE: 50 BPM | TEMPERATURE: 97.7 F | RESPIRATION RATE: 16 BRPM | HEIGHT: 70 IN | DIASTOLIC BLOOD PRESSURE: 75 MMHG | BODY MASS INDEX: 28.06 KG/M2

## 2024-03-19 DIAGNOSIS — I70.90 ATHEROSCLEROSIS: ICD-10-CM

## 2024-03-19 DIAGNOSIS — I10 ESSENTIAL HYPERTENSION: Primary | ICD-10-CM

## 2024-03-19 DIAGNOSIS — I25.10 ASCVD (ARTERIOSCLEROTIC CARDIOVASCULAR DISEASE): ICD-10-CM

## 2024-03-19 DIAGNOSIS — E78.5 DYSLIPIDEMIA: ICD-10-CM

## 2024-03-19 PROCEDURE — 3074F SYST BP LT 130 MM HG: CPT | Performed by: INTERNAL MEDICINE

## 2024-03-19 PROCEDURE — 3078F DIAST BP <80 MM HG: CPT | Performed by: INTERNAL MEDICINE

## 2024-03-19 PROCEDURE — 3008F BODY MASS INDEX DOCD: CPT | Performed by: INTERNAL MEDICINE

## 2024-03-19 PROCEDURE — 99214 OFFICE O/P EST MOD 30 MIN: CPT | Performed by: INTERNAL MEDICINE

## 2024-03-19 RX ORDER — FAMOTIDINE 40 MG/1
40 TABLET, FILM COATED ORAL 2 TIMES DAILY
COMMUNITY
Start: 2024-03-08 | End: 2024-06-11 | Stop reason: SDUPTHER

## 2024-03-19 ASSESSMENT — ENCOUNTER SYMPTOMS
ACTIVITY CHANGE: 0
ABDOMINAL PAIN: 0
FEVER: 0
DIARRHEA: 0
CHEST TIGHTNESS: 0
DIAPHORESIS: 0
WHEEZING: 0
FREQUENCY: 0
COUGH: 0
BLOOD IN STOOL: 0
HEADACHES: 0
FATIGUE: 0
BRUISES/BLEEDS EASILY: 0
DIZZINESS: 0
SHORTNESS OF BREATH: 0
PALPITATIONS: 0
DYSURIA: 0
CHILLS: 0
RHINORRHEA: 0
BACK PAIN: 0
UNEXPECTED WEIGHT CHANGE: 0
NAUSEA: 0
SORE THROAT: 0
CONSTIPATION: 0
ARTHRALGIAS: 0
APPETITE CHANGE: 0

## 2024-03-19 NOTE — PROGRESS NOTES
"      Subjective  Follow up     Patient ID: Joseph Fletcher is a 63 y.o. male.  1960      Joseph Fletcher is a 64 y/o male who presents for a follow up. Patient reports concerns over why his labs have changed. Patient reports that he has cut out cheese.     Family Hx: Patient reports that he has a family history of autoimmune diseases.       The following have been reviewed and updated as appropriate in this visit:        Review of Systems   Constitutional: Negative for activity change, appetite change, chills, diaphoresis, fatigue, fever and unexpected weight change.   HENT: Negative for congestion, ear pain, postnasal drip, rhinorrhea and sore throat.    Eyes: Negative for visual disturbance.   Respiratory: Negative for cough, chest tightness, shortness of breath and wheezing.    Cardiovascular: Negative for chest pain, palpitations and leg swelling.   Gastrointestinal: Negative for abdominal pain, blood in stool, constipation, diarrhea and nausea.   Genitourinary: Negative for dysuria, frequency and urgency.   Musculoskeletal: Negative for arthralgias and back pain.   Skin: Negative for rash.   Neurological: Negative for dizziness and headaches.   Hematological: Does not bruise/bleed easily.       Objective     Vitals:    03/19/24 1821 03/19/24 1847   BP: 130/79 120/75   BP Location: Left upper arm Right upper arm   Patient Position: Sitting Sitting   Pulse: (!) 50    Resp: 16    Temp: 36.5 °C (97.7 °F)    TempSrc: Oral    SpO2: 98%    Weight: 88.9 kg (196 lb)    Height: 1.778 m (5' 10\")      Body mass index is 28.12 kg/m².    Physical Exam  Constitutional:       Appearance: He is well-developed.   HENT:      Head: Normocephalic and atraumatic.      Nose: Nose normal.   Eyes:      Conjunctiva/sclera: Conjunctivae normal.      Pupils: Pupils are equal, round, and reactive to light.   Cardiovascular:      Rate and Rhythm: Normal rate and regular rhythm.      Heart sounds: Normal heart sounds. No murmur heard.     " No friction rub. No gallop.   Pulmonary:      Effort: Pulmonary effort is normal. No respiratory distress.      Breath sounds: Normal breath sounds. No wheezing or rales.   Chest:      Chest wall: No tenderness.   Musculoskeletal:         General: Normal range of motion.   Skin:     General: Skin is warm and dry.   Neurological:      Mental Status: He is alert and oriented to person, place, and time.      Cranial Nerves: No cranial nerve deficit.      Sensory: No sensory deficit.      Coordination: Coordination normal.      Deep Tendon Reflexes: Reflexes normal.         Assessment/Plan   Diagnoses and all orders for this visit:    Essential hypertension (Primary)  Comments:  Blood pressure okay  Orders:  -     Comprehensive metabolic panel; Future  -     Lipid panel; Future    Atherosclerosis  Comments:  Asymptomatic  Orders:  -     Ambulatory referral to Nutrition Services    ASCVD (arteriosclerotic cardiovascular disease)  Comments:  Stress echo negative.  Lipids excellent.  Continue aggressive lipid management and baby aspirin    Dyslipidemia  Comments:  Continue rosuvastatin          By signing my name below, I, Makeda Johnny, attest that this documentation has been prepared under the direction and in the presence of Nic Cochran MD      3/19/2024 7:03 PM

## 2024-06-03 ENCOUNTER — TELEPHONE (OUTPATIENT)
Dept: SCHEDULING | Facility: CLINIC | Age: 64
End: 2024-06-03

## 2024-06-03 NOTE — TELEPHONE ENCOUNTER
New Patient Appointment Request    Name of caller: Joseph Fletcher     Reason for Visit: Dyslipidemia / CAD / HTN / Previous Francesco pt    Insurance: Personal Choice     Insurance ID #: [id number]     Recent Cardiac Test/Procedures: SE 2/2/24    Referred by: odin fletcher (Spouse)     Primary Care Physician: Nic Cochran MD     Previous Cardiologist name and phone number: Dr. Maya    Best contact number: [phone]

## 2024-06-05 NOTE — TELEPHONE ENCOUNTER
Pt spouse calling back,     Scheduled 11/ 27/24 ,would like to know if could come in on 6/21 since his is off that day    Spouse is a pt or      Can  be reached at [phone]

## 2024-06-08 ENCOUNTER — APPOINTMENT (OUTPATIENT)
Dept: LAB | Facility: HOSPITAL | Age: 64
End: 2024-06-08
Attending: INTERNAL MEDICINE
Payer: COMMERCIAL

## 2024-06-08 DIAGNOSIS — I10 ESSENTIAL HYPERTENSION: ICD-10-CM

## 2024-06-08 LAB
ALBUMIN SERPL-MCNC: 4 G/DL (ref 3.5–5.7)
ALP SERPL-CCNC: 44 IU/L (ref 34–125)
ALT SERPL-CCNC: 20 IU/L (ref 7–52)
ANION GAP SERPL CALC-SCNC: 5 MEQ/L (ref 3–15)
AST SERPL-CCNC: 20 IU/L (ref 13–39)
BILIRUB SERPL-MCNC: 0.7 MG/DL (ref 0.3–1.2)
BUN SERPL-MCNC: 24 MG/DL (ref 7–25)
CALCIUM SERPL-MCNC: 9.2 MG/DL (ref 8.6–10.3)
CHLORIDE SERPL-SCNC: 105 MEQ/L (ref 98–107)
CHOLEST SERPL-MCNC: 128 MG/DL
CO2 SERPL-SCNC: 29 MEQ/L (ref 21–31)
CREAT SERPL-MCNC: 1.1 MG/DL (ref 0.7–1.3)
EGFRCR SERPLBLD CKD-EPI 2021: >60 ML/MIN/1.73M*2
GLUCOSE SERPL-MCNC: 111 MG/DL (ref 70–99)
HDLC SERPL-MCNC: 58 MG/DL
HDLC SERPL: 2.2 {RATIO}
LDLC SERPL CALC-MCNC: 61 MG/DL
NONHDLC SERPL-MCNC: 70 MG/DL
POTASSIUM SERPL-SCNC: 4.4 MEQ/L (ref 3.5–5.1)
PROT SERPL-MCNC: 6.5 G/DL (ref 6–8.2)
SODIUM SERPL-SCNC: 139 MEQ/L (ref 136–145)
TRIGL SERPL-MCNC: 44 MG/DL

## 2024-06-08 PROCEDURE — 80061 LIPID PANEL: CPT

## 2024-06-08 PROCEDURE — 80053 COMPREHEN METABOLIC PANEL: CPT

## 2024-06-08 PROCEDURE — 36415 COLL VENOUS BLD VENIPUNCTURE: CPT

## 2024-06-11 ENCOUNTER — OFFICE VISIT (OUTPATIENT)
Dept: PRIMARY CARE | Facility: CLINIC | Age: 64
End: 2024-06-11
Payer: COMMERCIAL

## 2024-06-11 VITALS
SYSTOLIC BLOOD PRESSURE: 120 MMHG | OXYGEN SATURATION: 98 % | WEIGHT: 196 LBS | HEART RATE: 44 BPM | HEIGHT: 70 IN | BODY MASS INDEX: 28.06 KG/M2 | RESPIRATION RATE: 16 BRPM | TEMPERATURE: 98.1 F | DIASTOLIC BLOOD PRESSURE: 80 MMHG

## 2024-06-11 DIAGNOSIS — E78.5 DYSLIPIDEMIA: ICD-10-CM

## 2024-06-11 DIAGNOSIS — I10 ESSENTIAL HYPERTENSION: ICD-10-CM

## 2024-06-11 DIAGNOSIS — I25.10 ASCVD (ARTERIOSCLEROTIC CARDIOVASCULAR DISEASE): ICD-10-CM

## 2024-06-11 DIAGNOSIS — Z00.00 PHYSICAL EXAM: Primary | ICD-10-CM

## 2024-06-11 PROCEDURE — 3008F BODY MASS INDEX DOCD: CPT | Performed by: INTERNAL MEDICINE

## 2024-06-11 PROCEDURE — 3074F SYST BP LT 130 MM HG: CPT | Performed by: INTERNAL MEDICINE

## 2024-06-11 PROCEDURE — 3079F DIAST BP 80-89 MM HG: CPT | Performed by: INTERNAL MEDICINE

## 2024-06-11 PROCEDURE — 99214 OFFICE O/P EST MOD 30 MIN: CPT | Performed by: INTERNAL MEDICINE

## 2024-06-11 RX ORDER — FAMOTIDINE 40 MG/1
40 TABLET, FILM COATED ORAL 2 TIMES DAILY
Qty: 90 TABLET | Refills: 1 | Status: SHIPPED | OUTPATIENT
Start: 2024-06-11

## 2024-06-11 RX ORDER — LOSARTAN POTASSIUM 50 MG/1
50 TABLET ORAL DAILY
Qty: 90 TABLET | Refills: 3 | Status: SHIPPED | OUTPATIENT
Start: 2024-06-11 | End: 2025-01-28

## 2024-06-11 RX ORDER — ROSUVASTATIN CALCIUM 40 MG/1
40 TABLET, COATED ORAL DAILY
Qty: 90 TABLET | Refills: 3 | Status: SHIPPED | OUTPATIENT
Start: 2024-06-11 | End: 2025-06-06

## 2024-06-11 RX ORDER — ASPIRIN 81 MG/1
81 TABLET ORAL DAILY
Qty: 90 TABLET | Refills: 3 | Status: SHIPPED | OUTPATIENT
Start: 2024-06-11 | End: 2025-06-06

## 2024-06-11 RX ORDER — MINERAL OIL
180 ENEMA (ML) RECTAL DAILY
Qty: 90 TABLET | Refills: 3 | Status: SHIPPED | OUTPATIENT
Start: 2024-06-11 | End: 2024-10-01

## 2024-06-11 ASSESSMENT — ENCOUNTER SYMPTOMS
FEVER: 0
LIGHT-HEADEDNESS: 0
SORE THROAT: 0
COUGH: 0
DIZZINESS: 0
EYE ITCHING: 0
SINUS PAIN: 0
EYE DISCHARGE: 0
NERVOUS/ANXIOUS: 0
APNEA: 0
RHINORRHEA: 0
POLYDIPSIA: 0
BACK PAIN: 0
SHORTNESS OF BREATH: 0
ARTHRALGIAS: 0
ABDOMINAL DISTENTION: 0
CONFUSION: 0
DYSURIA: 0
SINUS PRESSURE: 0
FREQUENCY: 0
CHILLS: 0
ABDOMINAL PAIN: 0
FATIGUE: 0

## 2024-06-11 NOTE — PROGRESS NOTES
Subjective   Follow-up chronic conditions  Patient ID: Joseph Fletcher is a 63 y.o. male.    HPI no new complaints    The following have been reviewed and updated as appropriate in this visit:   Tobacco       Review of Systems   Constitutional:  Negative for chills, fatigue and fever.   HENT:  Negative for congestion, rhinorrhea, sinus pressure, sinus pain and sore throat.    Eyes:  Negative for discharge and itching.   Respiratory:  Negative for apnea, cough and shortness of breath.    Cardiovascular:  Negative for chest pain and leg swelling.   Gastrointestinal:  Negative for abdominal distention and abdominal pain.   Endocrine: Negative for polydipsia and polyuria.   Genitourinary:  Negative for dysuria and frequency.   Musculoskeletal:  Negative for arthralgias and back pain.   Neurological:  Negative for dizziness and light-headedness.   Psychiatric/Behavioral:  Negative for confusion. The patient is not nervous/anxious.        Objective     Physical Exam  Constitutional:       Appearance: He is well-developed.   HENT:      Head: Normocephalic and atraumatic.      Nose: Nose normal.   Eyes:      Conjunctiva/sclera: Conjunctivae normal.      Pupils: Pupils are equal, round, and reactive to light.   Cardiovascular:      Rate and Rhythm: Normal rate and regular rhythm.      Heart sounds: Normal heart sounds. No murmur heard.     No friction rub. No gallop.   Pulmonary:      Effort: Pulmonary effort is normal. No respiratory distress.      Breath sounds: Normal breath sounds. No wheezing or rales.   Chest:      Chest wall: No tenderness.   Musculoskeletal:         General: Normal range of motion.   Skin:     General: Skin is warm and dry.   Neurological:      Mental Status: He is alert and oriented to person, place, and time.      Cranial Nerves: No cranial nerve deficit.      Sensory: No sensory deficit.      Motor: No abnormal muscle tone.      Coordination: Coordination normal.      Deep Tendon Reflexes:  Reflexes normal.         Assessment/Plan   Problem List Items Addressed This Visit          Circulatory    Essential hypertension     Blood pressure at goal         Relevant Medications    losartan (COZAAR) 50 mg tablet    Other Relevant Orders    Comprehensive metabolic panel    ASCVD (arteriosclerotic cardiovascular disease)     Asymptomatic.  Stress test negative.  Continue high-dose statin and aspirin            Other    Physical exam - Primary    Relevant Orders    Lipid panel    PSA    Vitamin D 25 hydroxy    Dyslipidemia     LDL cholesterol excellent

## 2024-09-30 NOTE — PROGRESS NOTES
Cuong Costello MD, Located within [facility]  Cardiology    [facility]    [facility]  [facility] [address]  [address] [place] [address]    TEL  [phone]  [facility]     10/01/24     Dear Dr. Cochran:    It was my pleasure to see Mr. Joseph Fletcher at the [facility] today.      As you know, this is a 64-year-old man with a history of coronary artery disease, dyslipidemia, and hypertension.  He previously followed by my colleague, Dr. Maya.    At his last visit he endorsed shortness of breath with activity.  A stress echocardiogram was performed which showed no evidence of ischemia. He feels well today. He plays tennis, bikes or lifts weights twice a week. He tolerates these activities without symptoms. He does note some shortness of breath after climbing a flight of stairs, but he reports this quickly resolves. The symptoms have been stable for quite some time. He does not experience this when exercising. He denies chest pain, lightheadedness, pre-syncope/syncope, orthopnea, PND or edema. He does not follow a specific diet.  He has no specific concerns or complaints today.    Past Medical History:  Past Medical History:   Diagnosis Date    Hypertension      Past Surgical History:  Past Surgical History   Procedure Laterality Date    Knee surgery       Medications:  Current Outpatient Medications   Medication Sig Dispense Refill    aspirin 81 mg enteric coated tablet Take 1 tablet (81 mg total) by mouth daily. 90 tablet 3    cetirizine (ZyrTEC) 10 mg tablet Take 10 mg by mouth daily.      losartan (COZAAR) 50 mg tablet Take 1 tablet (50 mg total) by mouth daily. 90 tablet 3    NAPROXEN ORAL Take 220 mg by mouth as needed. Take 2 tablets as needed      rosuvastatin (CRESTOR) 40 mg tablet Take 1 tablet (40 mg total) by mouth daily. 90 tablet 3    cholecalciferol, vitamin D3, 25 mcg (1,000 unit) capsule Take 1,000 Units by mouth daily. (Patient not taking:  Reported on 10/1/2024)      famotidine (PEPCID) 40 mg tablet Take 1 tablet (40 mg total) by mouth 2 (two) times a day. (Patient not taking: Reported on 10/1/2024) 90 tablet 1    fexofenadine (ALLEGRA) 180 mg tablet Take 1 tablet (180 mg total) by mouth daily. (Patient not taking: Reported on 10/1/2024) 90 tablet 3     No current facility-administered medications for this visit.       Allergies: No known allergies, No known drug allergies, and Pollen extracts    Social History: He is a  for [email or web address].  He lives with his wife, who is also my patient.  He is a never smoker. Occasionally drinks alcohol.    Family History: Brother with TIAs and CAD with stents. No family history of arrhythmias, cardiomyopathies, or sudden cardiac death.    Review of Systems: A complete 14-point review of systems is negative, except as noted in the HPI.    Exam:  Objective   Vitals:    10/01/24 1323   BP: 120/80   Pulse: (!) 56   Resp: 16   SpO2: 99%     Body mass index is 28.55 kg/m².  Wt Readings from Last 3 Encounters:   10/01/24 90.3 kg (199 lb)   06/11/24 88.9 kg (196 lb)   03/19/24 88.9 kg (196 lb)     Constitutional: Appears comfortable.   Eyes: No icterus.   ENT: Deferred.   Neck: No jugular venous distention.   Vascular: No carotid bruits.  Cardiac: Normal S1 and S2, regular rhythm. No murmurs, rubs, or gallops appreciated.  Lungs: Clear to auscultation bilaterally.    GI: Nondistended.  Extremities: Warm. No lower extremity edema.   Skin: Dry.  Neurologic: Awake, alert, oriented.    Psychiatric: No agitation.    Labs: Personally reviewed and discussed with the patient.  Notable for the following.   Lab Results   Component Value Date    LDLCALC 61 06/08/2024    NONHDLCHOL 112 06/05/2020    CHOL 128 06/08/2024    TRIG 44 06/08/2024    HDL 58 06/08/2024    CHOLHDLRAT 3.2 06/05/2020    HGBA1C 5.0 03/08/2024     06/08/2024    K 4.4 06/08/2024    BUN 24 06/08/2024    CREATININE 1.1 06/08/2024    WBC 5.15 10/23/2023     HGB 14.9 10/23/2023     10/23/2023     Lab Results   Component Value Date    ALT 20 06/08/2024    AST 20 06/08/2024    ALKPHOS 44 06/08/2024    BILITOT 0.7 06/08/2024     Cardiovascular Studies:   Coronary calcium score, 1/2024: Composite 1152 (LAD 1026, LCx 64, RCA 92).  95-100th percentile. Aorta unremarkable.  Stress echo, 2/2024: No ischemia.  Good exercise tolerance.  Baseline echo-normal LV size, wall thickness, LVEF 62%.  No regional abnormalities.  Normal RV size/function.  Normal LA size.  Tricuspid aortic valve with sclerotic leaflets.  No other significant valve pathology, though tricuspid valve not well-visualized.    ECG from today personally reviewed and discussed with the patient shows sinus bradycardia with inferior infarct pattern.      Assessment/Plan     Coronary artery disease  We discussed the prognostic significance of elevated calcium score today. He had a normal stress echo in 2/2024.  He has no anginal symptoms at a high workload. He is on rosuvastatin 40 mg daily with excellent response. He is on aspirin 81 mg given his high plaque burden, which he has tolerated well. Discussed importance of lifestyle measures. Given his family history, will also obtain carotid artery US for further risk stratification. I have ordered Lp(a) to be drawn prior to next visit.  He knows to contact us with any new cardiopulmonary symptoms.    Hyperlipidemia  Lipid lowering therapy with rosuvastatin, as above.    Hypertension  Blood pressure controlled today. He is maintained on losartan 50 mg daily. I have made no medication changes.       It was my pleasure to visit with Joseph Fletcher in clinic today.  He will follow-up in 6 months.  Please do not hesitate to contact me with any questions.      Sincerely,         ________________  Cuong Costelol MD    Patient seen with cardiology fellow, Dr. Pretty, who assisted with the above note.

## 2024-10-01 ENCOUNTER — OFFICE VISIT (OUTPATIENT)
Dept: CARDIOLOGY | Facility: CLINIC | Age: 64
End: 2024-10-01
Payer: COMMERCIAL

## 2024-10-01 VITALS
BODY MASS INDEX: 28.49 KG/M2 | WEIGHT: 199 LBS | DIASTOLIC BLOOD PRESSURE: 80 MMHG | HEIGHT: 70 IN | HEART RATE: 56 BPM | SYSTOLIC BLOOD PRESSURE: 120 MMHG | OXYGEN SATURATION: 99 % | RESPIRATION RATE: 16 BRPM

## 2024-10-01 DIAGNOSIS — I25.10 ATHEROSCLEROSIS OF NATIVE CORONARY ARTERY OF NATIVE HEART WITHOUT ANGINA PECTORIS: Primary | ICD-10-CM

## 2024-10-01 DIAGNOSIS — I10 ESSENTIAL HYPERTENSION: ICD-10-CM

## 2024-10-01 DIAGNOSIS — E78.5 DYSLIPIDEMIA: ICD-10-CM

## 2024-10-01 LAB
ATRIAL RATE: 56
P AXIS: 19
PR INTERVAL: 144
QRS DURATION: 80
QT INTERVAL: 420
QTC CALCULATION(BAZETT): 405
R AXIS: -11
T WAVE AXIS: 5
VENTRICULAR RATE: 56

## 2024-10-01 PROCEDURE — 93000 ELECTROCARDIOGRAM COMPLETE: CPT | Performed by: INTERNAL MEDICINE

## 2024-10-01 PROCEDURE — 99214 OFFICE O/P EST MOD 30 MIN: CPT | Mod: GC | Performed by: INTERNAL MEDICINE

## 2024-10-01 PROCEDURE — 3008F BODY MASS INDEX DOCD: CPT | Performed by: INTERNAL MEDICINE

## 2024-10-01 PROCEDURE — 3074F SYST BP LT 130 MM HG: CPT | Performed by: INTERNAL MEDICINE

## 2024-10-01 PROCEDURE — 3079F DIAST BP 80-89 MM HG: CPT | Performed by: INTERNAL MEDICINE

## 2024-10-01 RX ORDER — CETIRIZINE HYDROCHLORIDE 10 MG/1
10 TABLET ORAL DAILY
COMMUNITY

## 2024-10-16 ENCOUNTER — TELEPHONE (OUTPATIENT)
Dept: SCHEDULING | Facility: CLINIC | Age: 64
End: 2024-10-16
Payer: COMMERCIAL

## 2024-10-16 NOTE — TELEPHONE ENCOUNTER
Joseph Fletcher  Phone:[phone]  [facility]   NPI# [id number]  Patient requesting to schedule Asap       VAS01 - US CAROTID BILATERAL

## 2024-10-16 NOTE — TELEPHONE ENCOUNTER
Called and was unable to leave  because mailbox was full. I contacted his wife and she is going to reach out to him and have him call us so he can schedule the carotid U/S

## 2024-10-16 NOTE — TELEPHONE ENCOUNTER
[facility] Carotid: [id number] 10/16/24 -- 1/13/25     Please call pt to schedule test Thanks,

## 2024-10-24 ENCOUNTER — HOSPITAL ENCOUNTER (OUTPATIENT)
Dept: CARDIOLOGY | Facility: HOSPITAL | Age: 64
Discharge: HOME | End: 2024-10-24
Attending: INTERNAL MEDICINE
Payer: COMMERCIAL

## 2024-10-24 ENCOUNTER — TELEPHONE (OUTPATIENT)
Dept: CARDIOLOGY | Facility: CLINIC | Age: 64
End: 2024-10-24
Payer: COMMERCIAL

## 2024-10-24 VITALS — HEIGHT: 70 IN | BODY MASS INDEX: 28.49 KG/M2 | WEIGHT: 199 LBS

## 2024-10-24 DIAGNOSIS — E78.5 DYSLIPIDEMIA: ICD-10-CM

## 2024-10-24 DIAGNOSIS — I25.10 ATHEROSCLEROSIS OF NATIVE CORONARY ARTERY OF NATIVE HEART WITHOUT ANGINA PECTORIS: ICD-10-CM

## 2024-10-24 LAB
BSA FOR ECHO PROCEDURE: 2.11 M2
LEFT CCA DIST DIAS: 22 CM/S
LEFT CCA DIST SYS: 71.6 CM/S
LEFT CCA MID DIAS: 20.1 CM/S
LEFT CCA MID SYS: 78 CM/S
LEFT CCA PROX DIAS: 25.1 CM/S
LEFT CCA PROX SYS: 108 CM/S
LEFT ICA DIST DIAS: 24.8 CM/S
LEFT ICA DIST SYS: 65.4 CM/S
LEFT ICA MID DIAS: 23.7 CM/S
LEFT ICA MID SYS: 66.3 CM/S
LEFT ICA PROX DIAS: 16.3 CM/S
LEFT ICA PROX SYS: 46.1 CM/S
LEFT ICA/CCA SYS: 0.93
LEFT VERTEBRAL SYS: 72.1 CM/S
LT BULB EDV: 13.3 CM/S
LT BULB PSV: 51.9 CM/S
LT ECA PROX EDV: 12.2 CM/S
LT ECA PROX PSV: 65.2 CM/S
LT VERTEBRAL PROX EDV: 22.1 CM/S
LT VERTEBRAL PROX PSV: 72.1 CM/S
RIGHT CCA DIST DIAS: 19.2 CM/S
RIGHT CCA DIST SYS: 80.1 CM/S
RIGHT CCA MID DIAS: 24.1 CM/S
RIGHT CCA MID SYS: 96.6 CM/S
RIGHT CCA PROX DIAS: 16.1 CM/S
RIGHT CCA PROX SYS: 68.3 CM/S
RIGHT ECA SYS: 103 CM/S
RIGHT ICA DIST DIAS: 27.2 CM/S
RIGHT ICA DIST SYS: 78.6 CM/S
RIGHT ICA MID DIAS: 25.9 CM/S
RIGHT ICA MID SYS: 70.7 CM/S
RIGHT ICA PROX DIAS: 21.1 CM/S
RIGHT ICA PROX SYS: 71.1 CM/S
RIGHT ICA/CCA SYS: 0.98
RIGHT VA 1 EDV: 11.5 CM/S
RIGHT VA 1 MEAN: 22.83 CM/S
RIGHT VA 1 PI: 1.49
RIGHT VA 1 PSV: 45.5 CM/S
RIGHT VA 1 RI: 0.75
RIGHT VA 1 S/D RATIO: 3.96
RIGHT VERTEBRAL SYS: 45.5 CM/S
RT BULB EDV: 18.9 CM/S
RT BULB PSV: 61.5 CM/S
RT ECA PROC EDV: 17 CM/S
RT VERTEBRAL PROX EDV: 11.5 CM/S

## 2024-10-24 PROCEDURE — 93880 EXTRACRANIAL BILAT STUDY: CPT

## 2024-10-24 PROCEDURE — 93880 EXTRACRANIAL BILAT STUDY: CPT | Mod: 26 | Performed by: INTERNAL MEDICINE

## 2024-10-24 NOTE — TELEPHONE ENCOUNTER
Called and left  for pt. Advised as per Dr. Costello's review of his carotid ultrasound. Left our main number for him to return the call with any questions. Thank you.     ----- Message from Cuong Costello sent at 10/24/2024  8:25 AM EDT -----  Please let patient know that his carotid ultrasound showed plaque but no significant stenosis.  I recommend continuing his medical therapy.  Thanks.

## 2024-11-04 ENCOUNTER — APPOINTMENT (OUTPATIENT)
Dept: LAB | Facility: HOSPITAL | Age: 64
End: 2024-11-04
Attending: INTERNAL MEDICINE
Payer: COMMERCIAL

## 2024-11-04 DIAGNOSIS — Z00.00 PHYSICAL EXAM: ICD-10-CM

## 2024-11-04 DIAGNOSIS — E78.5 DYSLIPIDEMIA: ICD-10-CM

## 2024-11-04 DIAGNOSIS — I25.10 ATHEROSCLEROSIS OF NATIVE CORONARY ARTERY OF NATIVE HEART WITHOUT ANGINA PECTORIS: ICD-10-CM

## 2024-11-04 LAB — PSA SERPL-MCNC: 2.87 NG/ML

## 2024-11-04 PROCEDURE — 36415 COLL VENOUS BLD VENIPUNCTURE: CPT

## 2024-11-04 PROCEDURE — 83695 ASSAY OF LIPOPROTEIN(A): CPT

## 2024-11-04 PROCEDURE — 84153 ASSAY OF PSA TOTAL: CPT

## 2024-11-05 LAB — LPA SERPL-SCNC: 39 NMOL/L

## 2025-01-02 ENCOUNTER — TRANSCRIBE ORDERS (OUTPATIENT)
Dept: REGISTRATION | Facility: HOSPITAL | Age: 65
End: 2025-01-02

## 2025-01-02 ENCOUNTER — APPOINTMENT (OUTPATIENT)
Dept: LAB | Facility: HOSPITAL | Age: 65
End: 2025-01-02
Payer: COMMERCIAL

## 2025-01-02 DIAGNOSIS — L29.9 PRURITUS, UNSPECIFIED: ICD-10-CM

## 2025-01-02 DIAGNOSIS — E03.9 HYPOTHYROIDISM, UNSPECIFIED: ICD-10-CM

## 2025-01-02 DIAGNOSIS — E03.9 HYPOTHYROIDISM, UNSPECIFIED: Primary | ICD-10-CM

## 2025-01-02 LAB
ALBUMIN SERPL-MCNC: 4.1 G/DL (ref 3.5–5.7)
ALP SERPL-CCNC: 47 IU/L (ref 34–125)
ALT SERPL-CCNC: 34 IU/L (ref 7–52)
ANION GAP SERPL CALC-SCNC: 3 MEQ/L (ref 3–15)
AST SERPL-CCNC: 29 IU/L (ref 13–39)
BASOPHILS # BLD: 0.02 K/UL (ref 0.01–0.1)
BASOPHILS NFR BLD: 0.3 %
BILIRUB SERPL-MCNC: 0.7 MG/DL (ref 0.3–1.2)
BUN SERPL-MCNC: 25 MG/DL (ref 7–25)
CALCIUM SERPL-MCNC: 9 MG/DL (ref 8.6–10.3)
CHLORIDE SERPL-SCNC: 105 MEQ/L (ref 98–107)
CO2 SERPL-SCNC: 31 MEQ/L (ref 21–31)
CREAT SERPL-MCNC: 1 MG/DL (ref 0.7–1.3)
CRP SERPL-MCNC: 2 MG/L
DIFFERENTIAL METHOD BLD: NORMAL
EGFRCR SERPLBLD CKD-EPI 2021: >60 ML/MIN/1.73M*2
EOSINOPHIL # BLD: 0.07 K/UL (ref 0.04–0.54)
EOSINOPHIL NFR BLD: 1.2 %
ERYTHROCYTE [DISTWIDTH] IN BLOOD BY AUTOMATED COUNT: 12.8 % (ref 11.6–14.4)
GLUCOSE SERPL-MCNC: 115 MG/DL (ref 70–99)
HCT VFR BLD AUTO: 44.4 % (ref 40.1–51)
HGB BLD-MCNC: 14.8 G/DL (ref 13.7–17.5)
IMM GRANULOCYTES # BLD AUTO: 0.02 K/UL (ref 0–0.08)
IMM GRANULOCYTES NFR BLD AUTO: 0.3 %
LYMPHOCYTES # BLD: 1.37 K/UL (ref 1.2–3.5)
LYMPHOCYTES NFR BLD: 22.5 %
MCH RBC QN AUTO: 30.6 PG (ref 28–33.2)
MCHC RBC AUTO-ENTMCNC: 33.3 G/DL (ref 32.2–36.5)
MCV RBC AUTO: 91.7 FL (ref 83–98)
MONOCYTES # BLD: 0.46 K/UL (ref 0.3–1)
MONOCYTES NFR BLD: 7.6 %
NEUTROPHILS # BLD: 4.14 K/UL (ref 1.7–7)
NEUTS SEG NFR BLD: 68.1 %
NRBC BLD-RTO: 0 %
PLATELET # BLD AUTO: 206 K/UL (ref 150–350)
PMV BLD AUTO: 10.3 FL (ref 9.4–12.4)
POTASSIUM SERPL-SCNC: 4.2 MEQ/L (ref 3.5–5.1)
PROT SERPL-MCNC: 6.6 G/DL (ref 6–8.2)
RBC # BLD AUTO: 4.84 M/UL (ref 4.5–5.8)
SODIUM SERPL-SCNC: 139 MEQ/L (ref 136–145)
T4 FREE SERPL-MCNC: 0.72 NG/DL (ref 0.58–1.64)
TSH SERPL DL<=0.05 MIU/L-ACNC: 1.08 MIU/L (ref 0.34–5.6)
WBC # BLD AUTO: 6.08 K/UL (ref 3.8–10.5)

## 2025-01-02 PROCEDURE — 86334 IMMUNOFIX E-PHORESIS SERUM: CPT

## 2025-01-02 PROCEDURE — 86140 C-REACTIVE PROTEIN: CPT

## 2025-01-02 PROCEDURE — 85025 COMPLETE CBC W/AUTO DIFF WBC: CPT

## 2025-01-02 PROCEDURE — 82784 ASSAY IGA/IGD/IGG/IGM EACH: CPT

## 2025-01-02 PROCEDURE — 84439 ASSAY OF FREE THYROXINE: CPT

## 2025-01-02 PROCEDURE — 80053 COMPREHEN METABOLIC PANEL: CPT

## 2025-01-02 PROCEDURE — 36415 COLL VENOUS BLD VENIPUNCTURE: CPT

## 2025-01-02 PROCEDURE — 84443 ASSAY THYROID STIM HORMONE: CPT

## 2025-01-03 LAB
IGA SERPL-MCNC: 155 MG/DL (ref 84.5–499)
IGG SERPL-MCNC: 1197 MG/DL (ref 537–1535)
IGM SERPL-MCNC: 77 MG/DL (ref 35–242)
INTERPRETATION SERPL IFE-IMP: NORMAL

## 2025-01-28 ENCOUNTER — OFFICE VISIT (OUTPATIENT)
Dept: PRIMARY CARE | Facility: CLINIC | Age: 65
End: 2025-01-28
Payer: COMMERCIAL

## 2025-01-28 VITALS
HEART RATE: 56 BPM | HEIGHT: 70 IN | BODY MASS INDEX: 28.92 KG/M2 | OXYGEN SATURATION: 96 % | RESPIRATION RATE: 16 BRPM | SYSTOLIC BLOOD PRESSURE: 120 MMHG | WEIGHT: 202 LBS | DIASTOLIC BLOOD PRESSURE: 85 MMHG | TEMPERATURE: 97 F

## 2025-01-28 DIAGNOSIS — I10 ESSENTIAL HYPERTENSION: ICD-10-CM

## 2025-01-28 DIAGNOSIS — E78.5 DYSLIPIDEMIA: ICD-10-CM

## 2025-01-28 DIAGNOSIS — I25.10 ASCVD (ARTERIOSCLEROTIC CARDIOVASCULAR DISEASE): ICD-10-CM

## 2025-01-28 DIAGNOSIS — D89.2 PARAPROTEINEMIA: ICD-10-CM

## 2025-01-28 PROCEDURE — 3008F BODY MASS INDEX DOCD: CPT | Performed by: INTERNAL MEDICINE

## 2025-01-28 PROCEDURE — 99214 OFFICE O/P EST MOD 30 MIN: CPT | Performed by: INTERNAL MEDICINE

## 2025-01-28 PROCEDURE — 3079F DIAST BP 80-89 MM HG: CPT | Performed by: INTERNAL MEDICINE

## 2025-01-28 PROCEDURE — 3074F SYST BP LT 130 MM HG: CPT | Performed by: INTERNAL MEDICINE

## 2025-01-28 RX ORDER — MINERAL OIL
180 ENEMA (ML) RECTAL 4 TIMES DAILY
COMMUNITY

## 2025-01-28 ASSESSMENT — ENCOUNTER SYMPTOMS
VOMITING: 0
SLEEP DISTURBANCE: 0
COUGH: 0
WEAKNESS: 0
NAUSEA: 0
FEVER: 0
ENDOCRINE NEGATIVE: 1
ALLERGIC/IMMUNOLOGIC NEGATIVE: 1
CHILLS: 0
FATIGUE: 0
SORE THROAT: 0
ADENOPATHY: 0
ABDOMINAL PAIN: 0
SHORTNESS OF BREATH: 0
NECK PAIN: 0
CONFUSION: 0
APPETITE CHANGE: 0
CHEST TIGHTNESS: 0
DIZZINESS: 0
FREQUENCY: 0
SINUS PRESSURE: 0
DIARRHEA: 0
BACK PAIN: 0

## 2025-01-28 NOTE — PATIENT INSTRUCTIONS
Please get your blood work done before your next appointment with Dr. Cochran. Follow up with Dr. Cochran 1 week later.

## 2025-01-28 NOTE — PROGRESS NOTES
"Subjective  Office Visit Follow Up     Patient ID: Joseph Fletcher is a 64 y.o. male.    HPI    Joseph Fletcher is a 64 y.o. male presenting for follow up. Wear eye glasses. Has allergist and cardiologist doctors.     Denies groin pain.       The following have been reviewed and updated as appropriate in this visit:     Allergies  Meds  Problems         Review of Systems   Constitutional:  Negative for appetite change, chills, fatigue and fever.   HENT:  Negative for congestion, postnasal drip, sinus pressure and sore throat.    Eyes:  Negative for visual disturbance.        Wears eye glasses   Respiratory:  Negative for cough, chest tightness and shortness of breath.    Cardiovascular:  Negative for chest pain.   Gastrointestinal:  Negative for abdominal pain, diarrhea, nausea and vomiting.   Endocrine: Negative.    Genitourinary:  Negative for frequency and urgency.        Denies groin pain   Musculoskeletal:  Negative for back pain, gait problem and neck pain.   Skin:  Negative for rash.   Allergic/Immunologic: Negative.    Neurological:  Negative for dizziness and weakness.   Hematological:  Negative for adenopathy.   Psychiatric/Behavioral:  Negative for confusion and sleep disturbance.        Objective     BP Readings from Last 3 Encounters:   01/28/25 120/85   10/01/24 120/80   06/11/24 120/80       Lab Results   Component Value Date    CHOL 128 06/08/2024     Lab Results   Component Value Date    HDL 58 06/08/2024     Lab Results   Component Value Date    LDLCALC 61 06/08/2024     Lab Results   Component Value Date    TRIG 44 06/08/2024       No results found for: \"CHOLHDL\"      Physical Exam  Constitutional:       Appearance: Normal appearance.   HENT:      Head: Normocephalic.      Right Ear: Tympanic membrane normal.      Left Ear: Tympanic membrane normal.      Nose: Nose normal.   Eyes:      Extraocular Movements: Extraocular movements intact.      Pupils: Pupils are equal, round, and reactive to " light.   Cardiovascular:      Rate and Rhythm: Normal rate and regular rhythm.   Pulmonary:      Effort: Pulmonary effort is normal.      Breath sounds: Normal breath sounds.   Musculoskeletal:         General: Normal range of motion.      Cervical back: Normal range of motion.   Neurological:      Mental Status: He is alert. Mental status is at baseline.   Psychiatric:         Mood and Affect: Mood normal.         Behavior: Behavior normal.         Assessment/Plan   Diagnoses and all orders for this visit:    Paraproteinemia  Comments:  Allergist found abnormal protein studies and evaluating pruritus.  Will recheck SPEP and serum immunoelectrophoresis get serum free light chains  Orders:  -     Protein Elect, Serum w/reflx Immuno; Future  -     Immunofixation electrophoresis, serum; Future  -     Immunoglobulins, IgG, IgA, IgM; Future  -     Immunoglobulin free LT chains blood; Future    Essential hypertension  Comments:  BP okay    ASCVD (arteriosclerotic cardiovascular disease)  Comments:  Asymptomatic    Dyslipidemia  Comments:  Continue statin        Follow up 1 week later      By signing my name below, I, Lobo Tariq, attest that this documentation has been prepared under the direction and in the presence of Nic Cochran MD      1/28/2025 5:33 PM    Lobo Tariq

## 2025-01-31 ENCOUNTER — APPOINTMENT (OUTPATIENT)
Dept: LAB | Facility: HOSPITAL | Age: 65
End: 2025-01-31
Attending: INTERNAL MEDICINE
Payer: COMMERCIAL

## 2025-01-31 DIAGNOSIS — D89.2 PARAPROTEINEMIA: ICD-10-CM

## 2025-01-31 PROCEDURE — 84165 PROTEIN E-PHORESIS SERUM: CPT

## 2025-01-31 PROCEDURE — 82784 ASSAY IGA/IGD/IGG/IGM EACH: CPT

## 2025-01-31 PROCEDURE — 86334 IMMUNOFIX E-PHORESIS SERUM: CPT

## 2025-01-31 PROCEDURE — 83521 IG LIGHT CHAINS FREE EACH: CPT

## 2025-01-31 PROCEDURE — 36415 COLL VENOUS BLD VENIPUNCTURE: CPT

## 2025-02-02 LAB
KAPPA LC SERPL-MCNC: 15.61 MG/L (ref 8.96–34.28)
KAPPA LC/LAMBDA SER: 1.98 {RATIO} (ref 0.64–1.83)
LAMBDA LC SERPL-MCNC: 7.9 MG/L (ref 5.7–26.3)

## 2025-02-03 LAB
ALBUMIN MFR UR ELPH: 66.8 %
ALBUMIN SERPL ELPH-MCNC: 4.41 G/DL (ref 3.2–4.9)
ALBUMIN/GLOB SERPL: 2 {RATIO} (ref 1.1–2.1)
ALPHA1 GLOB MFR SERPL ELPH: 2.1 %
ALPHA1 GLOB SERPL ELPH-MCNC: 0.14 G/DL (ref 0.08–0.23)
ALPHA2 GLOB MFR UR ELPH: 9.1 %
ALPHA2 GLOB SERPL ELPH-MCNC: 0.6 G/DL (ref 0.45–0.92)
B-GLOBULIN SERPL ELPH-MCNC: 0.61 G/DL (ref 0.5–1.03)
BETA1 GLOB MFR UR ELPH: 9.2 %
GAMMA GLOB MFR UR ELPH: 12.8 %
GAMMA GLOB SERPL ELPH-MCNC: 0.84 G/DL (ref 0.6–1.6)
IGA SERPL-MCNC: 172 MG/DL (ref 84.5–499)
IGG SERPL-MCNC: 1172 MG/DL (ref 537–1535)
IGM SERPL-MCNC: 72 MG/DL (ref 35–242)
PROT PATTERN SERPL ELPH-IMP: NORMAL
PROT SERPL-MCNC: 6.6 G/DL (ref 6–8.2)

## 2025-02-04 LAB — INTERPRETATION SERPL IFE-IMP: NORMAL

## 2025-04-07 ENCOUNTER — TELEPHONE (OUTPATIENT)
Dept: SCHEDULING | Facility: CLINIC | Age: 65
End: 2025-04-07
Payer: COMMERCIAL

## 2025-04-07 DIAGNOSIS — E78.5 HYPERLIPIDEMIA, UNSPECIFIED HYPERLIPIDEMIA TYPE: Primary | ICD-10-CM

## 2025-04-07 NOTE — TELEPHONE ENCOUNTER
Pt asking if he needs labs drawn before next appt 04/25. Pt can be reached at [phone]    Pt uses [facility]

## 2025-04-07 NOTE — TELEPHONE ENCOUNTER
Called and left  for pt. Advised Dr. Costello  has ordered a fasting lipid panel to be drawn prior to his OV. Advised we sent the lab orders to [facility] labs. Thank you.

## 2025-04-12 ENCOUNTER — APPOINTMENT (OUTPATIENT)
Dept: LAB | Facility: HOSPITAL | Age: 65
End: 2025-04-12
Attending: INTERNAL MEDICINE
Payer: COMMERCIAL

## 2025-04-12 ENCOUNTER — RESULTS FOLLOW-UP (OUTPATIENT)
Dept: CARDIOLOGY | Facility: CLINIC | Age: 65
End: 2025-04-12
Payer: COMMERCIAL

## 2025-04-12 DIAGNOSIS — E78.5 HYPERLIPIDEMIA, UNSPECIFIED HYPERLIPIDEMIA TYPE: ICD-10-CM

## 2025-04-12 LAB
CHOLEST SERPL-MCNC: 145 MG/DL
HDLC SERPL-MCNC: 61 MG/DL
HDLC SERPL: 2.4 {RATIO}
LDLC SERPL CALC-MCNC: 75 MG/DL
NONHDLC SERPL-MCNC: 84 MG/DL
TRIGL SERPL-MCNC: 44 MG/DL

## 2025-04-12 PROCEDURE — 36415 COLL VENOUS BLD VENIPUNCTURE: CPT

## 2025-04-12 PROCEDURE — 80061 LIPID PANEL: CPT

## 2025-04-14 NOTE — TELEPHONE ENCOUNTER
Called and left a VM. Advised Dr. Costello will discuss next steps regarding his cholesterol management at his upcoming OV on 4/25. Thank you.    ----- Message from Cuong Costello sent at 4/14/2025 10:01 AM EDT -----  Okay.  We can discuss next steps at his upcoming visit on 4/25.  Thanks.

## 2025-04-14 NOTE — TELEPHONE ENCOUNTER
Called and spoke with pt. Advised of Dr. Costello's review of his labs. He states he has been taking rosuvastatin 40 mg daily. He states his diet and exercise remain optimal. He reports eating a sirloin steak the other night for the first time in a while but states he generally eats fish.   Please advise on next steps.   Thank you!      ----- Message from Cuong Costello sent at 4/12/2025  3:46 PM EDT -----  Please call patient.  LDL has increased compared with labs in June 2024.  Does he continue to take rosuvastatin 40 mg daily?  Any changes to his lifestyle habits?  Based on his response we can   determine next steps.  Thanks.  ----- Message -----  From: Lab, Background User  Sent: 4/12/2025   8:52 AM EDT  To: Cuong Costello MD

## 2025-04-25 ENCOUNTER — OFFICE VISIT (OUTPATIENT)
Dept: CARDIOLOGY | Facility: CLINIC | Age: 65
End: 2025-04-25
Payer: COMMERCIAL

## 2025-04-25 VITALS
SYSTOLIC BLOOD PRESSURE: 120 MMHG | WEIGHT: 201 LBS | HEIGHT: 70 IN | DIASTOLIC BLOOD PRESSURE: 78 MMHG | OXYGEN SATURATION: 98 % | BODY MASS INDEX: 28.77 KG/M2 | HEART RATE: 72 BPM

## 2025-04-25 DIAGNOSIS — I10 ESSENTIAL HYPERTENSION: ICD-10-CM

## 2025-04-25 DIAGNOSIS — E78.5 DYSLIPIDEMIA: ICD-10-CM

## 2025-04-25 DIAGNOSIS — I65.29 CAROTID ATHEROSCLEROSIS, UNSPECIFIED LATERALITY: ICD-10-CM

## 2025-04-25 DIAGNOSIS — I25.10 ATHEROSCLEROSIS OF NATIVE CORONARY ARTERY OF NATIVE HEART WITHOUT ANGINA PECTORIS: Primary | ICD-10-CM

## 2025-04-25 LAB
ATRIAL RATE: 60
P AXIS: 39
PR INTERVAL: 160
QRS DURATION: 82
QT INTERVAL: 418
QTC CALCULATION(BAZETT): 418
R AXIS: -20
T WAVE AXIS: 0
VENTRICULAR RATE: 60

## 2025-04-25 PROCEDURE — 3074F SYST BP LT 130 MM HG: CPT | Performed by: INTERNAL MEDICINE

## 2025-04-25 PROCEDURE — 93000 ELECTROCARDIOGRAM COMPLETE: CPT | Performed by: INTERNAL MEDICINE

## 2025-04-25 PROCEDURE — 3008F BODY MASS INDEX DOCD: CPT | Performed by: INTERNAL MEDICINE

## 2025-04-25 PROCEDURE — 99214 OFFICE O/P EST MOD 30 MIN: CPT | Performed by: INTERNAL MEDICINE

## 2025-04-25 PROCEDURE — 3078F DIAST BP <80 MM HG: CPT | Performed by: INTERNAL MEDICINE

## 2025-04-25 NOTE — PROGRESS NOTES
Cuong Costello MD, [facility]  Cardiology    [facility]    [facility]  [address]  [address] [place] [address]    TEL  [phone]  [facility]     04/25/25     Dear Dr. Cocharn:    It was my pleasure to see Mr. Joseph Fletcher at the [facility] today.      As you know, this is a 64-year-old man with a history of coronary artery disease, dyslipidemia, and hypertension.      Joseph has felt well overall since his last visit.  No cardiopulmonary concerns or complaints.  Specifically, no chest pain or pressure.  He has a longstanding history of mild dyspnea with significant activity.  The symptoms are unchanged.  No dyspnea with routine activity.  No dyspnea at rest.  No orthopnea or PND.  No lower extremity edema.  No palpitations or syncope.  He remains very active on a regular basis without any cardiopulmonary limitations.  No changes to his diet.  He is tolerating his medications and reports no side effects.    Past Medical History:  Past Medical History:   Diagnosis Date    Hypertension      Past Surgical History:  Past Surgical History   Procedure Laterality Date    Knee surgery       Medications:  Current Outpatient Medications   Medication Sig Dispense Refill    aspirin 81 mg enteric coated tablet Take 1 tablet (81 mg total) by mouth daily. 90 tablet 3    famotidine (PEPCID) 40 mg tablet Take 1 tablet (40 mg total) by mouth 2 (two) times a day. 90 tablet 1    fexofenadine (ALLEGRA) 180 mg tablet Take 1 tablet (180 mg total) by mouth daily. (Patient taking differently: Take 180 mg by mouth daily. Taking 2 in the am and 2 at night) 90 tablet 3    fexofenadine (ALLEGRA) 180 mg tablet Take 180 mg by mouth 4 (four) times a day.      losartan (COZAAR) 50 mg tablet Take 1 tablet (50 mg total) by mouth daily. 90 tablet 3    NAPROXEN ORAL Take 220 mg by mouth as needed. Take 2 tablets as needed      rosuvastatin (CRESTOR) 40 mg tablet  Take 1 tablet (40 mg total) by mouth daily. 90 tablet 3    cetirizine (ZyrTEC) 10 mg tablet Take 10 mg by mouth daily.      cholecalciferol, vitamin D3, 25 mcg (1,000 unit) capsule Take 1,000 Units by mouth daily. (Patient not taking: Reported on 10/1/2024)       No current facility-administered medications for this visit.       Allergies: No known allergies, No known drug allergies, and Pollen extracts    Social History: He is a  for [employer].  He lives with his wife, who is also my patient.  He is a never smoker. Occasionally drinks alcohol.    Family History: Brother with TIAs and CAD with stents. No family history of arrhythmias, cardiomyopathies, or sudden cardiac death.    Review of Systems: A complete 14-point review of systems is negative, except as noted in the HPI.    Exam:  Objective   Vitals:    04/25/25 0735   BP: 120/78   Pulse: 72   SpO2: 98%     Body mass index is 28.84 kg/m².  Wt Readings from Last 3 Encounters:   04/25/25 91.2 kg (201 lb)   01/28/25 91.6 kg (202 lb)   10/24/24 90.3 kg (199 lb)     Constitutional: Appears comfortable.   Eyes: No icterus.   ENT: Deferred.   Neck: No jugular venous distention.   Vascular: No carotid bruits.  Cardiac: Normal S1 and S2, regular rhythm. No murmurs, rubs, or gallops appreciated.  Lungs: Clear to auscultation bilaterally.    GI: Nondistended.  Extremities: Warm. No lower extremity edema.   Skin: Dry.  Neurologic: Awake, alert, oriented.    Psychiatric: No agitation.    Labs: Personally reviewed and discussed with the patient.  Notable for the following.   Lab Results   Component Value Date    LDLCALC 75 04/12/2025    NONHDLCHOL 112 06/05/2020    CHOL 145 04/12/2025    TRIG 44 04/12/2025    HDL 61 04/12/2025    CHOLHDLRAT 3.2 06/05/2020    HGBA1C 5.0 03/08/2024     01/02/2025    K 4.2 01/02/2025    BUN 25 01/02/2025    CREATININE 1.0 01/02/2025    WBC 6.08 01/02/2025    HGB 14.8 01/02/2025     01/02/2025     Lab Results   Component  Value Date    ALT 34 01/02/2025    AST 29 01/02/2025    ALKPHOS 47 01/02/2025    BILITOT 0.7 01/02/2025     LP(a) 39 nmol/L    Cardiovascular Studies:   Coronary calcium score, 1/2024: Composite 1152 (LAD 1026, LCx 64, RCA 92).  95-100th percentile. Aorta unremarkable.  Stress echo, 2/2024: No ischemia.  Good exercise tolerance.  Baseline echo-normal LV size, wall thickness, LVEF 62%.  No regional abnormalities.  Normal RV size/function.  Normal LA size.  Tricuspid aortic valve with sclerotic leaflets.  No other significant valve pathology, though tricuspid valve not well-visualized.  Carotid ultrasound, 10/2024: Plaque with less than 50% stenosis bilaterally.    ECG from today personally reviewed and discussed with the patient shows sinus rhythm.      Assessment/Plan     Coronary artery disease  He had a normal stress echo in 2/2024.  He has no anginal symptoms at a high workload. He is on rosuvastatin 40 mg daily with excellent response.  His LDL did increase slightly on his most recent labs.  We discussed dietary modifications.  He is on aspirin 81 mg given his high plaque burden, which he has tolerated well.  I would recommend no changes to his primary prevention regimen today.  Will repeat a lipid profile before his follow-up visit.  He knows to contact me with any new cardiopulmonary symptoms.    Carotid atherosclerosis  Plaque with less than 50% stenosis noted on his recent carotid ultrasound.  Primary prevention strategy as above.  We can discuss a surveillance carotid ultrasound in the coming years.    Hyperlipidemia  Lipid lowering therapy with rosuvastatin, as above.    Hypertension  Blood pressure controlled today. He is maintained on losartan 50 mg daily. I have made no medication changes.       It was my pleasure to visit with Joseph Fletcher in clinic today.  He will follow-up in 6 months.  Please do not hesitate to contact me with any questions.        Sincerely,       ________________  Cuong Costello  MD

## 2025-05-19 ENCOUNTER — OFFICE VISIT (OUTPATIENT)
Dept: PRIMARY CARE | Facility: CLINIC | Age: 65
End: 2025-05-19
Payer: COMMERCIAL

## 2025-05-19 VITALS
BODY MASS INDEX: 28.77 KG/M2 | OXYGEN SATURATION: 96 % | TEMPERATURE: 98.4 F | RESPIRATION RATE: 18 BRPM | HEIGHT: 70 IN | HEART RATE: 55 BPM | SYSTOLIC BLOOD PRESSURE: 122 MMHG | WEIGHT: 201 LBS | DIASTOLIC BLOOD PRESSURE: 62 MMHG

## 2025-05-19 DIAGNOSIS — J30.2 SEASONAL ALLERGIES: ICD-10-CM

## 2025-05-19 DIAGNOSIS — J06.9 VIRAL URI: Primary | ICD-10-CM

## 2025-05-19 DIAGNOSIS — I10 ESSENTIAL HYPERTENSION: ICD-10-CM

## 2025-05-19 PROCEDURE — 3074F SYST BP LT 130 MM HG: CPT | Performed by: NURSE PRACTITIONER

## 2025-05-19 PROCEDURE — 99213 OFFICE O/P EST LOW 20 MIN: CPT | Performed by: NURSE PRACTITIONER

## 2025-05-19 PROCEDURE — 3008F BODY MASS INDEX DOCD: CPT | Performed by: NURSE PRACTITIONER

## 2025-05-19 PROCEDURE — 3078F DIAST BP <80 MM HG: CPT | Performed by: NURSE PRACTITIONER

## 2025-05-20 ENCOUNTER — TELEPHONE (OUTPATIENT)
Dept: PRIMARY CARE | Facility: CLINIC | Age: 65
End: 2025-05-20
Payer: COMMERCIAL

## 2025-06-14 ENCOUNTER — APPOINTMENT (OUTPATIENT)
Dept: LAB | Facility: HOSPITAL | Age: 65
End: 2025-06-14
Attending: SURGERY
Payer: COMMERCIAL

## 2025-06-14 DIAGNOSIS — D89.2 HYPERGAMMAGLOBULINEMIA: Primary | ICD-10-CM

## 2025-06-14 DIAGNOSIS — R76.9 ABNORMAL IMMUNOLOGICAL FINDING IN SERUM: ICD-10-CM

## 2025-06-14 DIAGNOSIS — Z00.00 ROUTINE GENERAL MEDICAL EXAMINATION AT A HEALTH CARE FACILITY: ICD-10-CM

## 2025-06-14 DIAGNOSIS — D80.8 LIGHT CHAIN DISEASE (CMS/HCC): ICD-10-CM

## 2025-06-14 DIAGNOSIS — R73.01 IMPAIRED FASTING GLUCOSE: ICD-10-CM

## 2025-06-14 LAB
ALBUMIN SERPL-MCNC: 4.2 G/DL (ref 3.5–5.7)
ALP SERPL-CCNC: 50 IU/L (ref 34–125)
ALT SERPL-CCNC: 32 IU/L (ref 7–52)
ANION GAP SERPL CALC-SCNC: 5 MEQ/L (ref 3–15)
AST SERPL-CCNC: 30 IU/L (ref 13–39)
BACTERIA URNS QL MICRO: ABNORMAL /HPF
BASOPHILS # BLD: 0.03 K/UL (ref 0.01–0.1)
BASOPHILS NFR BLD: 0.5 %
BILIRUB SERPL-MCNC: 0.7 MG/DL (ref 0.3–1.2)
BILIRUB UR QL STRIP.AUTO: NEGATIVE MG/DL
BUN SERPL-MCNC: 22 MG/DL (ref 7–25)
CALCIUM SERPL-MCNC: 9.2 MG/DL (ref 8.6–10.3)
CHLORIDE SERPL-SCNC: 104 MEQ/L (ref 98–107)
CLARITY UR REFRACT.AUTO: CLEAR
CO2 SERPL-SCNC: 29 MEQ/L (ref 21–31)
COLOR UR AUTO: YELLOW
CREAT SERPL-MCNC: 1.2 MG/DL (ref 0.7–1.3)
DIFFERENTIAL METHOD BLD: ABNORMAL
EGFRCR SERPLBLD CKD-EPI 2021: >60 ML/MIN/1.73M*2
EOSINOPHIL # BLD: 0.08 K/UL (ref 0.04–0.54)
EOSINOPHIL NFR BLD: 1.4 %
ERYTHROCYTE [DISTWIDTH] IN BLOOD BY AUTOMATED COUNT: 12.8 % (ref 11.6–14.4)
GLUCOSE SERPL-MCNC: 95 MG/DL (ref 70–99)
GLUCOSE UR STRIP.AUTO-MCNC: NEGATIVE MG/DL
HCT VFR BLD AUTO: 43.5 % (ref 40.1–51)
HGB BLD-MCNC: 14.9 G/DL (ref 13.7–17.5)
HGB UR QL STRIP.AUTO: 3
HYALINE CASTS #/AREA URNS LPF: ABNORMAL /LPF
IMM GRANULOCYTES # BLD AUTO: 0.01 K/UL (ref 0–0.08)
IMM GRANULOCYTES NFR BLD AUTO: 0.2 %
KETONES UR STRIP.AUTO-MCNC: NEGATIVE MG/DL
LEUKOCYTE ESTERASE UR QL STRIP.AUTO: NEGATIVE
LYMPHOCYTES # BLD: 1.16 K/UL (ref 1.2–3.5)
LYMPHOCYTES NFR BLD: 19.8 %
MCH RBC QN AUTO: 30.3 PG (ref 28–33.2)
MCHC RBC AUTO-ENTMCNC: 34.3 G/DL (ref 32.2–36.5)
MCV RBC AUTO: 88.6 FL (ref 83–98)
MONOCYTES # BLD: 0.56 K/UL (ref 0.3–1)
MONOCYTES NFR BLD: 9.6 %
MUCOUS THREADS URNS QL MICRO: 1 /LPF
NEUTROPHILS # BLD: 4.02 K/UL (ref 1.7–7)
NEUTS SEG NFR BLD: 68.5 %
NITRITE UR QL STRIP.AUTO: NEGATIVE
NRBC BLD-RTO: 0 %
PH UR STRIP.AUTO: 5.5 [PH]
PLATELET # BLD AUTO: 210 K/UL (ref 150–350)
PMV BLD AUTO: 10.4 FL (ref 9.4–12.4)
POTASSIUM SERPL-SCNC: 4.6 MEQ/L (ref 3.5–5.1)
PROT SERPL-MCNC: 6.9 G/DL (ref 6–8.2)
PROT UR QL STRIP.AUTO: NEGATIVE
PSA SERPL-MCNC: 2.85 NG/ML
RBC # BLD AUTO: 4.91 M/UL (ref 4.5–5.8)
RBC #/AREA URNS HPF: ABNORMAL /HPF
SODIUM SERPL-SCNC: 138 MEQ/L (ref 136–145)
SP GR UR REFRACT.AUTO: 1.03
SQUAMOUS URNS QL MICRO: ABNORMAL /HPF
T4 FREE SERPL-MCNC: 0.93 NG/DL (ref 0.58–1.64)
TSH SERPL DL<=0.05 MIU/L-ACNC: 2 MIU/L (ref 0.34–5.6)
UROBILINOGEN UR STRIP-ACNC: 0.2 EU/DL
WBC # BLD AUTO: 5.86 K/UL (ref 3.8–10.5)
WBC #/AREA URNS HPF: ABNORMAL /HPF

## 2025-06-14 PROCEDURE — 36415 COLL VENOUS BLD VENIPUNCTURE: CPT

## 2025-06-14 PROCEDURE — 84443 ASSAY THYROID STIM HORMONE: CPT | Mod: GA

## 2025-06-14 PROCEDURE — 84165 PROTEIN E-PHORESIS SERUM: CPT

## 2025-06-14 PROCEDURE — 84153 ASSAY OF PSA TOTAL: CPT | Mod: GA

## 2025-06-14 PROCEDURE — 81003 URINALYSIS AUTO W/O SCOPE: CPT

## 2025-06-14 PROCEDURE — 85025 COMPLETE CBC W/AUTO DIFF WBC: CPT

## 2025-06-14 PROCEDURE — 80053 COMPREHEN METABOLIC PANEL: CPT

## 2025-06-14 PROCEDURE — 83521 IG LIGHT CHAINS FREE EACH: CPT

## 2025-06-14 PROCEDURE — 84439 ASSAY OF FREE THYROXINE: CPT | Mod: GA

## 2025-06-14 PROCEDURE — 82784 ASSAY IGA/IGD/IGG/IGM EACH: CPT

## 2025-06-15 LAB
IGA SERPL-MCNC: 185 MG/DL (ref 84.5–499)
IGG SERPL-MCNC: 1214 MG/DL (ref 537–1535)
IGM SERPL-MCNC: 82 MG/DL (ref 35–242)

## 2025-06-20 LAB
ALBUMIN MFR UR ELPH: 64.2 %
ALBUMIN SERPL ELPH-MCNC: 4.43 G/DL (ref 3.2–4.9)
ALBUMIN/GLOB SERPL: 1.8 {RATIO} (ref 1.1–2.1)
ALPHA1 GLOB MFR SERPL ELPH: 2.7 %
ALPHA1 GLOB SERPL ELPH-MCNC: 0.19 G/DL (ref 0.08–0.23)
ALPHA2 GLOB MFR UR ELPH: 11.1 %
ALPHA2 GLOB SERPL ELPH-MCNC: 0.77 G/DL (ref 0.45–0.92)
B-GLOBULIN SERPL ELPH-MCNC: 0.52 G/DL (ref 0.5–1.03)
BETA1 GLOB MFR UR ELPH: 7.6 %
GAMMA GLOB MFR UR ELPH: 14.4 %
GAMMA GLOB SERPL ELPH-MCNC: 0.99 G/DL (ref 0.6–1.6)
KAPPA LC SERPL-MCNC: 17.42 MG/L (ref 8.96–34.28)
KAPPA LC/LAMBDA SER: 1.04 {RATIO} (ref 0.64–1.83)
LAMBDA LC SERPL-MCNC: 16.7 MG/L (ref 5.7–26.3)
PROT PATTERN SERPL ELPH-IMP: NORMAL
PROT SERPL-MCNC: 6.9 G/DL (ref 6–8.2)

## 2025-06-23 LAB — INTERPRETATION SERPL IFE-IMP: NORMAL
